# Patient Record
Sex: FEMALE | Race: BLACK OR AFRICAN AMERICAN | Employment: FULL TIME | URBAN - METROPOLITAN AREA
[De-identification: names, ages, dates, MRNs, and addresses within clinical notes are randomized per-mention and may not be internally consistent; named-entity substitution may affect disease eponyms.]

---

## 2020-01-28 ENCOUNTER — HOSPITAL ENCOUNTER (EMERGENCY)
Age: 50
Discharge: HOME | End: 2020-01-28
Attending: EMERGENCY MEDICINE
Payer: COMMERCIAL

## 2020-01-28 ENCOUNTER — APPOINTMENT (OUTPATIENT)
Dept: GENERAL RADIOLOGY | Age: 50
End: 2020-01-28
Payer: COMMERCIAL

## 2020-01-28 VITALS
RESPIRATION RATE: 14 BRPM | HEART RATE: 81 BPM | DIASTOLIC BLOOD PRESSURE: 92 MMHG | SYSTOLIC BLOOD PRESSURE: 147 MMHG | TEMPERATURE: 98.3 F | OXYGEN SATURATION: 99 %

## 2020-01-28 PROCEDURE — 73590 X-RAY EXAM OF LOWER LEG: CPT

## 2020-01-28 PROCEDURE — 99283 EMERGENCY DEPT VISIT LOW MDM: CPT

## 2020-01-28 ASSESSMENT — ENCOUNTER SYMPTOMS: COLOR CHANGE: 0

## 2020-01-28 ASSESSMENT — PAIN DESCRIPTION - PAIN TYPE: TYPE: ACUTE PAIN

## 2020-01-28 ASSESSMENT — PAIN DESCRIPTION - LOCATION: LOCATION: LEG

## 2020-01-28 ASSESSMENT — PAIN SCALES - GENERAL: PAINLEVEL_OUTOF10: 10

## 2020-01-28 ASSESSMENT — PAIN DESCRIPTION - ORIENTATION: ORIENTATION: LEFT;LOWER

## 2020-01-28 ASSESSMENT — PAIN DESCRIPTION - DESCRIPTORS: DESCRIPTORS: ACHING

## 2020-01-28 NOTE — ED PROVIDER NOTES
[facility] ED  Emergency Department Encounter  EmergencyMedicine Resident     Pt Name:Paddy Epstein  MRN: [id number]  [place] 1970  Date of evaluation: 1/28/20  PCP:  SAMMIE Lin CNP    CHIEF COMPLAINT       Chief Complaint   Patient presents with    Leg Pain     left lower leg       HISTORY OF PRESENT ILLNESS  (Location/Symptom, Timing/Onset, Context/Setting, Quality, Duration, Modifying Factors, Severity.)      Hernán Nolasco is a 51 y.o. female who presents with left shin pain which has been ongoing for the last 3 weeks. She states that generally this is worse in the morning and improves over the course of the day. States that she has been using ibuprofen without significant improvement in symptoms. She denies any known injury to the area. She states that she has a very active job and is often on her feet. PAST MEDICAL / SURGICAL / SOCIAL / FAMILY HISTORY      has a past medical history of Hypertension. has a past surgical history that includes Tubal ligation.     Social History     Socioeconomic History    Marital status:      Spouse name: Not on file    Number of children: Not on file    Years of education: Not on file    Highest education level: Not on file   Occupational History    Not on file   Social Needs    Financial resource strain: Not on file    Food insecurity:     Worry: Not on file     Inability: Not on file    Transportation needs:     Medical: Not on file     Non-medical: Not on file   Tobacco Use    Smoking status: Former Smoker   Substance and Sexual Activity    Alcohol use: No    Drug use: No    Sexual activity: Not on file   Lifestyle    Physical activity:     Days per week: Not on file     Minutes per session: Not on file    Stress: Not on file   Relationships    Social connections:     Talks on phone: Not on file     Gets together: Not on file     Attends Evangelical service: Not on file     Active member of club or organization: Not on file     Attends meetings of clubs or organizations: Not on file     Relationship status: Not on file    Intimate partner violence:     Fear of current or ex partner: Not on file     Emotionally abused: Not on file     Physically abused: Not on file     Forced sexual activity: Not on file   Other Topics Concern    Not on file   Social History Narrative    Not on file       History reviewed. No pertinent family history. Allergies:  Bactrim [sulfamethoxazole-trimethoprim]    Home Medications:  Prior to Admission medications    Not on File       REVIEW OF SYSTEMS    (2-9 systems for level 4, 10 or more for level 5)      Review of Systems   Cardiovascular: Negative for leg swelling. Musculoskeletal: Negative for joint swelling. Skin: Negative for color change, rash and wound. Neurological: Negative for weakness and numbness. PHYSICAL EXAM   (up to 7 for level 4, 8 or more for level 5)      INITIAL VITALS:   BP (!) 147/92   Pulse 81   Temp 98.3 °F (36.8 °C) (Oral)   Resp 14   LMP 01/02/2020 (Within Days)   SpO2 99%     Physical Exam  Constitutional:       Appearance: Normal appearance. She is normal weight. Cardiovascular:      Rate and Rhythm: Normal rate and regular rhythm. Pulmonary:      Effort: Pulmonary effort is normal.      Breath sounds: Normal breath sounds. Musculoskeletal:      Comments: Mild tenderness of the lateral aspect of the left lower leg, no bony tenderness of the fibula or tibia. Popliteal, DP, and PT pulses intact and symmetrical.  Bilateral lower extremity strength intact and symmetrical.  Sensation intact and symmetrical   Skin:     General: Skin is warm. Capillary Refill: Capillary refill takes less than 2 seconds. Neurological:      Mental Status: She is alert.          DIFFERENTIAL  DIAGNOSIS     PLAN (LABS / IMAGING / EKG):  Orders Placed This Encounter   Procedures    XR TIBIA FIBULA LEFT (2 VIEWS)       MEDICATIONS ORDERED:  No

## 2020-01-28 NOTE — ED PROVIDER NOTES
[facility]     Emergency Department     Faculty Attestation    I performed a history and physical examination of the patient and discussed management with the resident. I reviewed the residents note and agree with the documented findings and plan of care. Any areas of disagreement are noted on the chart. I was personally present for the key portions of any procedures. I have documented in the chart those procedures where I was not present during the key portions. I have reviewed the emergency nurses triage note. I agree with the chief complaint, past medical history, past surgical history, allergies, medications, social and family history as documented unless otherwise noted below. Documentation of the HPI, Physical Exam and Medical Decision Making performed by medical students or scribes is based on my personal performance of the HPI, PE and MDM. For Physician Assistant/ Nurse Practitioner cases/documentation I have personally evaluated this patient and have completed at least one if not all key elements of the E/M (history, physical exam, and MDM). Additional findings are as noted.     Vital signs:   Vitals:    01/28/20 1321   BP: (!) 147/92   Pulse: 81   Resp: 14   Temp: 98.3 °F (36.8 °C)   SpO2: 99%                Nae Summers M.D,  Attending Emergency  Physician            Yamile Madrigal MD  01/28/20 3455

## 2020-01-28 NOTE — LETTER
[facility] ED  [address] [address] [address] [address]  Phone: [phone]               January 28, 2020    Patient: Sara Mercado   YOB: 1970   Date of Visit: 1/28/2020       To Whom It May Concern:    Norma Brewer was seen and treated in our emergency department on 1/28/2020. She may return to 01-.       Sincerely,     [facility] ER department         Signature:__________________________________

## 2021-09-19 ENCOUNTER — APPOINTMENT (OUTPATIENT)
Dept: GENERAL RADIOLOGY | Age: 51
DRG: 871 | End: 2021-09-19
Payer: COMMERCIAL

## 2021-09-19 ENCOUNTER — HOSPITAL ENCOUNTER (INPATIENT)
Age: 51
LOS: 2 days | Discharge: HOME | DRG: 871 | End: 2021-09-21
Attending: EMERGENCY MEDICINE | Admitting: FAMILY MEDICINE
Payer: COMMERCIAL

## 2021-09-19 DIAGNOSIS — R73.9 HYPERGLYCEMIA: ICD-10-CM

## 2021-09-19 DIAGNOSIS — U07.1 COVID-19: Primary | ICD-10-CM

## 2021-09-19 PROBLEM — J12.82 PNEUMONIA DUE TO COVID-19 VIRUS: Status: ACTIVE | Noted: 2021-09-19

## 2021-09-19 LAB
ABSOLUTE EOS #: 0 K/UL (ref 0–0.4)
ABSOLUTE IMMATURE GRANULOCYTE: 0.04 K/UL (ref 0–0.3)
ABSOLUTE LYMPH #: 0.88 K/UL (ref 1–4.8)
ABSOLUTE MONO #: 0.17 K/UL (ref 0.1–0.8)
ALBUMIN SERPL-MCNC: 3.7 G/DL (ref 3.5–5.2)
ALBUMIN/GLOBULIN RATIO: 1.1 (ref 1–2.5)
ALP BLD-CCNC: 58 U/L (ref 35–104)
ALT SERPL-CCNC: 16 U/L (ref 5–33)
ANION GAP SERPL CALCULATED.3IONS-SCNC: 13 MMOL/L (ref 9–17)
AST SERPL-CCNC: 27 U/L
ATYPICAL LYMPHOCYTE ABSOLUTE COUNT: 0.04 K/UL
ATYPICAL LYMPHOCYTES: 1 %
BASOPHILS # BLD: 0 % (ref 0–2)
BASOPHILS ABSOLUTE: 0 K/UL (ref 0–0.2)
BILIRUB SERPL-MCNC: 0.24 MG/DL (ref 0.3–1.2)
BUN BLDV-MCNC: 7 MG/DL (ref 6–20)
BUN/CREAT BLD: ABNORMAL (ref 9–20)
C-REACTIVE PROTEIN: 47.1 MG/L (ref 0–5)
CALCIUM SERPL-MCNC: 8.2 MG/DL (ref 8.6–10.4)
CHLORIDE BLD-SCNC: 103 MMOL/L (ref 98–107)
CO2: 22 MMOL/L (ref 20–31)
CREAT SERPL-MCNC: 0.68 MG/DL (ref 0.5–0.9)
D-DIMER QUANTITATIVE: 0.62 MG/L FEU
DIFFERENTIAL TYPE: ABNORMAL
EOSINOPHILS RELATIVE PERCENT: 0 % (ref 1–4)
FERRITIN: 49 UG/L (ref 13–150)
FIBRINOGEN: 520 MG/DL (ref 140–420)
GFR AFRICAN AMERICAN: >60 ML/MIN
GFR NON-AFRICAN AMERICAN: >60 ML/MIN
GFR SERPL CREATININE-BSD FRML MDRD: ABNORMAL ML/MIN/{1.73_M2}
GFR SERPL CREATININE-BSD FRML MDRD: ABNORMAL ML/MIN/{1.73_M2}
GLUCOSE BLD-MCNC: 95 MG/DL (ref 70–99)
HCT VFR BLD CALC: 38.1 % (ref 36.3–47.1)
HEMOGLOBIN: 11.7 G/DL (ref 11.9–15.1)
IMMATURE GRANULOCYTES: 1 %
LACTATE DEHYDROGENASE: 308 U/L (ref 135–214)
LACTIC ACID, SEPSIS WHOLE BLOOD: 1.3 MMOL/L (ref 0.5–1.9)
LACTIC ACID, SEPSIS: NORMAL MMOL/L (ref 0.5–1.9)
LYMPHOCYTES # BLD: 21 % (ref 24–44)
MCH RBC QN AUTO: 26.1 PG (ref 25.2–33.5)
MCHC RBC AUTO-ENTMCNC: 30.7 G/DL (ref 28.4–34.8)
MCV RBC AUTO: 85 FL (ref 82.6–102.9)
MONOCYTES # BLD: 4 % (ref 1–7)
MORPHOLOGY: ABNORMAL
MORPHOLOGY: ABNORMAL
NRBC AUTOMATED: 0 PER 100 WBC
PDW BLD-RTO: 15.5 % (ref 11.8–14.4)
PLATELET # BLD: 250 K/UL (ref 138–453)
PLATELET ESTIMATE: ABNORMAL
PMV BLD AUTO: 10 FL (ref 8.1–13.5)
POTASSIUM SERPL-SCNC: 3.5 MMOL/L (ref 3.7–5.3)
PROCALCITONIN: 0.05 NG/ML
RBC # BLD: 4.48 M/UL (ref 3.95–5.11)
RBC # BLD: ABNORMAL 10*6/UL
SARS-COV-2, RAPID: DETECTED
SEG NEUTROPHILS: 73 % (ref 36–66)
SEGMENTED NEUTROPHILS ABSOLUTE COUNT: 3.07 K/UL (ref 1.8–7.7)
SODIUM BLD-SCNC: 138 MMOL/L (ref 135–144)
SPECIMEN DESCRIPTION: ABNORMAL
TOTAL PROTEIN: 7.1 G/DL (ref 6.4–8.3)
TROPONIN INTERP: NORMAL
TROPONIN T: NORMAL NG/ML
TROPONIN, HIGH SENSITIVITY: <6 NG/L (ref 0–14)
WBC # BLD: 4.2 K/UL (ref 3.5–11.3)
WBC # BLD: ABNORMAL 10*3/UL

## 2021-09-19 PROCEDURE — 85384 FIBRINOGEN ACTIVITY: CPT

## 2021-09-19 PROCEDURE — 84484 ASSAY OF TROPONIN QUANT: CPT

## 2021-09-19 PROCEDURE — 83605 ASSAY OF LACTIC ACID: CPT

## 2021-09-19 PROCEDURE — 86140 C-REACTIVE PROTEIN: CPT

## 2021-09-19 PROCEDURE — 84145 PROCALCITONIN (PCT): CPT

## 2021-09-19 PROCEDURE — 96374 THER/PROPH/DIAG INJ IV PUSH: CPT

## 2021-09-19 PROCEDURE — 85025 COMPLETE CBC W/AUTO DIFF WBC: CPT

## 2021-09-19 PROCEDURE — 93005 ELECTROCARDIOGRAM TRACING: CPT | Performed by: STUDENT IN AN ORGANIZED HEALTH CARE EDUCATION/TRAINING PROGRAM

## 2021-09-19 PROCEDURE — 6360000002 HC RX W HCPCS: Performed by: STUDENT IN AN ORGANIZED HEALTH CARE EDUCATION/TRAINING PROGRAM

## 2021-09-19 PROCEDURE — 87635 SARS-COV-2 COVID-19 AMP PRB: CPT

## 2021-09-19 PROCEDURE — 71045 X-RAY EXAM CHEST 1 VIEW: CPT

## 2021-09-19 PROCEDURE — 99284 EMERGENCY DEPT VISIT MOD MDM: CPT

## 2021-09-19 PROCEDURE — 82728 ASSAY OF FERRITIN: CPT

## 2021-09-19 PROCEDURE — 83615 LACTATE (LD) (LDH) ENZYME: CPT

## 2021-09-19 PROCEDURE — 80053 COMPREHEN METABOLIC PANEL: CPT

## 2021-09-19 PROCEDURE — 85379 FIBRIN DEGRADATION QUANT: CPT

## 2021-09-19 PROCEDURE — 1200000000 HC SEMI PRIVATE

## 2021-09-19 PROCEDURE — 2580000003 HC RX 258: Performed by: STUDENT IN AN ORGANIZED HEALTH CARE EDUCATION/TRAINING PROGRAM

## 2021-09-19 PROCEDURE — 6370000000 HC RX 637 (ALT 250 FOR IP): Performed by: STUDENT IN AN ORGANIZED HEALTH CARE EDUCATION/TRAINING PROGRAM

## 2021-09-19 PROCEDURE — 87040 BLOOD CULTURE FOR BACTERIA: CPT

## 2021-09-19 PROCEDURE — 96375 TX/PRO/DX INJ NEW DRUG ADDON: CPT

## 2021-09-19 RX ORDER — ACETAMINOPHEN 650 MG/1
650 SUPPOSITORY RECTAL EVERY 6 HOURS PRN
Status: DISCONTINUED | OUTPATIENT
Start: 2021-09-19 | End: 2021-09-21 | Stop reason: HOSPADM

## 2021-09-19 RX ORDER — VITAMIN B COMPLEX
2000 TABLET ORAL DAILY
Status: DISCONTINUED | OUTPATIENT
Start: 2021-09-20 | End: 2021-09-20

## 2021-09-19 RX ORDER — SODIUM CHLORIDE 0.9 % (FLUSH) 0.9 %
5-40 SYRINGE (ML) INJECTION EVERY 12 HOURS SCHEDULED
Status: DISCONTINUED | OUTPATIENT
Start: 2021-09-19 | End: 2021-09-21 | Stop reason: HOSPADM

## 2021-09-19 RX ORDER — SODIUM CHLORIDE 0.9 % (FLUSH) 0.9 %
10 SYRINGE (ML) INJECTION PRN
Status: DISCONTINUED | OUTPATIENT
Start: 2021-09-19 | End: 2021-09-21 | Stop reason: HOSPADM

## 2021-09-19 RX ORDER — DEXAMETHASONE SODIUM PHOSPHATE 10 MG/ML
10 INJECTION INTRAMUSCULAR; INTRAVENOUS ONCE
Status: COMPLETED | OUTPATIENT
Start: 2021-09-19 | End: 2021-09-19

## 2021-09-19 RX ORDER — 0.9 % SODIUM CHLORIDE 0.9 %
1000 INTRAVENOUS SOLUTION INTRAVENOUS ONCE
Status: COMPLETED | OUTPATIENT
Start: 2021-09-19 | End: 2021-09-19

## 2021-09-19 RX ORDER — ACETAMINOPHEN 325 MG/1
650 TABLET ORAL ONCE
Status: COMPLETED | OUTPATIENT
Start: 2021-09-19 | End: 2021-09-19

## 2021-09-19 RX ORDER — ONDANSETRON 4 MG/1
4 TABLET, ORALLY DISINTEGRATING ORAL EVERY 8 HOURS PRN
Status: DISCONTINUED | OUTPATIENT
Start: 2021-09-19 | End: 2021-09-21 | Stop reason: HOSPADM

## 2021-09-19 RX ORDER — ACETAMINOPHEN 325 MG/1
650 TABLET ORAL EVERY 6 HOURS PRN
Status: DISCONTINUED | OUTPATIENT
Start: 2021-09-19 | End: 2021-09-21 | Stop reason: HOSPADM

## 2021-09-19 RX ORDER — GUAIFENESIN DEXTROMETHORPHAN HYDROBROMIDE ORAL SOLUTION 10; 100 MG/5ML; MG/5ML
5 SOLUTION ORAL EVERY 4 HOURS PRN
Status: DISCONTINUED | OUTPATIENT
Start: 2021-09-19 | End: 2021-09-21 | Stop reason: HOSPADM

## 2021-09-19 RX ORDER — ONDANSETRON 2 MG/ML
4 INJECTION INTRAMUSCULAR; INTRAVENOUS EVERY 6 HOURS PRN
Status: DISCONTINUED | OUTPATIENT
Start: 2021-09-19 | End: 2021-09-21 | Stop reason: HOSPADM

## 2021-09-19 RX ORDER — SODIUM CHLORIDE 9 MG/ML
25 INJECTION, SOLUTION INTRAVENOUS PRN
Status: DISCONTINUED | OUTPATIENT
Start: 2021-09-19 | End: 2021-09-21 | Stop reason: HOSPADM

## 2021-09-19 RX ADMIN — Medication 500 MG: at 20:08

## 2021-09-19 RX ADMIN — ACETAMINOPHEN 650 MG: 325 TABLET ORAL at 20:04

## 2021-09-19 RX ADMIN — DEXAMETHASONE SODIUM PHOSPHATE 10 MG: 10 INJECTION INTRAMUSCULAR; INTRAVENOUS at 20:04

## 2021-09-19 RX ADMIN — CEFTRIAXONE SODIUM 1000 MG: 1 INJECTION, POWDER, FOR SOLUTION INTRAMUSCULAR; INTRAVENOUS at 20:08

## 2021-09-19 RX ADMIN — SODIUM CHLORIDE 1000 ML: 9 INJECTION, SOLUTION INTRAVENOUS at 21:15

## 2021-09-19 ASSESSMENT — ENCOUNTER SYMPTOMS
RHINORRHEA: 0
ABDOMINAL PAIN: 0
SORE THROAT: 0
CHEST TIGHTNESS: 0
CONSTIPATION: 0
COUGH: 0
TACHYPNEA: 1
VOMITING: 0
SHORTNESS OF BREATH: 1
NAUSEA: 0
DIARRHEA: 0
BLOOD IN STOOL: 0
WHEEZING: 0

## 2021-09-19 ASSESSMENT — PAIN SCALES - GENERAL: PAINLEVEL_OUTOF10: 0

## 2021-09-19 NOTE — ED NOTES
Pt arrived to ED via self. Pt states she was exposed to  who tested positive. Pt states she has lost of taste and smel, SOB, and cough. Pt states she was tested because of an ordered test after finding out  was positive for covid. Pt states breathing is most difficult when laying flat. Pt states when she assessed her pulse ox today at home it was 87% RA. Pt routinely checks pulse ox.        Maxine Rosenthal RN  09/19/21 5718

## 2021-09-19 NOTE — ED PROVIDER NOTES
[facility] [address] ED     Emergency Department     Faculty Attestation    I performed a history and physical examination of the patient and discussed management with the resident. I reviewed the residents note and agree with the documented findings and plan of care. Any areas of disagreement are noted on the chart. I was personally present for the key portions of any procedures. I have documented in the chart those procedures where I was not present during the key portions. I have reviewed the emergency nurses triage note. I agree with the chief complaint, past medical history, past surgical history, allergies, medications, social and family history as documented unless otherwise noted below. For Physician Assistant/ Nurse Practitioner cases/documentation I have personally evaluated this patient and have completed at least one if not all key elements of the E/M (history, physical exam, and MDM). Additional findings are as noted. Patient who tested positive for Covid on Tuesday of this week presents with increasing shortness of breath. She says that she was to the point where she was starting to feel short of breath even at rest.  She says she has had fever and body aches. She denies abdominal pain, vomiting or diarrhea. On exam, patient is resting comfortably in the bed. Lungs are clear to auscultation bilaterally and heart sounds are tachycardic but regular. Abdomen is soft and nontender. Patient's oxygen saturation was in the 80s on room air. She is now up into the high 90s on 2 L nasal cannula. Will get chest x-ray and labs and plan to admit patient.     EKG Interpretation    Interpreted by emergency department physician    Rhythm: sinus tachycardia  Rate: tachycardia  Axis: normal  Ectopy: none  Conduction: normal  ST Segments: normal  T Waves: non specific changes  Q Waves: none    Clinical Impression: non-specific EKG and sinus tachycardia    MD Mckenzie Colmenares MD  Attending Emergency  Physician              Vladimir Weiss MD  09/19/21 Haider Yoder

## 2021-09-19 NOTE — ED PROVIDER NOTES
[facility] [address]  Emergency Department Encounter  EmergencyMedicine Resident     Pt Name:Paddy Maier  MRN: [id number]  [place] 1970  Date of evaluation: 9/19/21  PCP:  Janiya Howard, APRN - CNP    This patient was evaluated in the Emergency Department for symptoms described in the history of present illness. The patient was evaluated in the context of the global COVID-19 pandemic, which necessitated consideration that the patient might be at risk for infection with the SARS-CoV-2 virus that causes COVID-19. Institutional protocols and algorithms that pertain to the evaluation of patients at risk for COVID-19 are in a state of rapid change based on information released by regulatory bodies including the CDC and federal and state organizations. These policies and algorithms were followed during the patient's care in the ED. CHIEF COMPLAINT       Chief Complaint   Patient presents with    Positive For Covid-19     test came back Tuesday    Shortness of Breath       HISTORY OF PRESENT ILLNESS  (Location/Symptom, Timing/Onset, Context/Setting, Quality, Duration, Modifying Factors, Severity.)      Odessa Singh is a 49 y.o. female who presents with shortness of breath. Patient presents to the emergency department with shortness of breath for 2 days, progressively worsening, has pulse oximeter at home, satting in the mid 80s at rest, associated with generalized fatigue, lightheadedness, congestion, fever, chills, dysuria. Patient denies headache, visual changes, chest pain, abdominal pain, nausea, vomiting, diarrhea, lower extremity swelling or pain. Patient denies any past medical history, patient denies HTN, HLD, DM, CAD, DVT/PE, COPD, asthma. Patient does not take any medications daily. Patient tested positive for Covid 5 days ago, but was asymptomatic at that time,  was symptomatic and positive for Covid.     PAST MEDICAL / SURGICAL / SOCIAL / FAMILY HISTORY      has a past medical history of Hypertension. has a past surgical history that includes Tubal ligation. Social History     Socioeconomic History    Marital status:      Spouse name: Not on file    Number of children: Not on file    Years of education: Not on file    Highest education level: Not on file   Occupational History    Not on file   Tobacco Use    Smoking status: Former Smoker   Substance and Sexual Activity    Alcohol use: No    Drug use: No    Sexual activity: Not on file   Other Topics Concern    Not on file   Social History Narrative    Not on file     Social Determinants of Health     Financial Resource Strain:     Difficulty of Paying Living Expenses:    Food Insecurity:     Worried About [address] in the Last Year:     [address] [place] in the Last Year:    Transportation Needs:     Lack of Transportation (Medical):  Lack of Transportation (Non-Medical):    Physical Activity:     Days of Exercise per Week:     Minutes of Exercise per Session:    Stress:     Feeling of Stress :    Social Connections:     Frequency of Communication with Friends and Family:     Frequency of Social Gatherings with Friends and Family:     Attends Bahai Services:     Active Member of Clubs or Organizations:     Attends Club or Organization Meetings:     Marital Status:    Intimate Partner Violence:     Fear of Current or Ex-Partner:     Emotionally Abused:     Physically Abused:     Sexually Abused:        History reviewed. No pertinent family history. Allergies:  Bactrim [sulfamethoxazole-trimethoprim]    Home Medications:  Prior to Admission medications    Not on File       REVIEW OF SYSTEMS    (2-9 systems for level 4, 10 or more for level 5)      Review of Systems   Constitutional: Positive for chills, fatigue and fever. Negative for diaphoresis. HENT: Positive for congestion. Negative for rhinorrhea and sore throat. Eyes: Negative for visual disturbance.    Respiratory: Positive for shortness of breath. Negative for cough, chest tightness and wheezing. Cardiovascular: Negative for chest pain, palpitations and leg swelling. Gastrointestinal: Negative for abdominal pain, blood in stool, constipation, diarrhea, nausea and vomiting. Genitourinary: Positive for dysuria. Negative for frequency, hematuria and urgency. Musculoskeletal: Negative for arthralgias, myalgias, neck pain and neck stiffness. Skin: Negative for pallor and rash. Neurological: Positive for weakness and light-headedness. Negative for dizziness, syncope and headaches. Psychiatric/Behavioral: Negative for confusion. PHYSICAL EXAM   (up to 7 for level 4, 8 or more for level 5)      INITIAL VITALS:   /84   Pulse 87   Temp 100.4 °F (38 °C) (Oral)   Resp 22   SpO2 97%     Physical Exam  Constitutional:       General: She is not in acute distress. Appearance: She is well-developed. She is not ill-appearing, toxic-appearing or diaphoretic. Comments: Patient is alert and oriented, openly communicative, does not appear to be short of breath, speaking in full sentences, appears fatigued, but not ill or toxic appearing. HENT:      Head: Normocephalic and atraumatic. Right Ear: External ear normal.      Left Ear: External ear normal.   Eyes:      General:         Right eye: No discharge. Left eye: No discharge. Extraocular Movements: Extraocular movements intact. Pupils: Pupils are equal, round, and reactive to light. Neck:      Vascular: No JVD. Trachea: No tracheal deviation. Cardiovascular:      Rate and Rhythm: Normal rate and regular rhythm. Pulses: Normal pulses. Heart sounds: Murmur heard. No friction rub. No gallop. Comments: Patient has grade 2/6 holosystolic murmur  Pulmonary:      Effort: Pulmonary effort is normal. No respiratory distress. Breath sounds: Normal breath sounds. No stridor. No wheezing, rhonchi or rales. Chest:      Chest wall: No tenderness. Abdominal:      General: There is no distension. Palpations: Abdomen is soft. There is no mass. Tenderness: There is no abdominal tenderness. There is no right CVA tenderness, left CVA tenderness or guarding. Musculoskeletal:         General: No tenderness. Normal range of motion. Cervical back: Normal range of motion and neck supple. No rigidity or tenderness. Right lower leg: No edema. Left lower leg: No edema. Skin:     General: Skin is warm. Capillary Refill: Capillary refill takes less than 2 seconds. Neurological:      General: No focal deficit present. Mental Status: She is alert and oriented to person, place, and time. Cranial Nerves: No cranial nerve deficit. Sensory: No sensory deficit. Motor: No weakness. Coordination: Coordination normal.   Psychiatric:         Mood and Affect: Mood normal.         Behavior: Behavior normal.         DIFFERENTIAL  DIAGNOSIS     PLAN (LABS / IMAGING / EKG):  Orders Placed This Encounter   Procedures    Culture, Blood 1    Culture, Blood 1    COVID-19, Rapid    Culture, Urine    Sputum gram stain    Respiratory Culture    Legionella antigen, urine    Strep Pneumoniae Antigen    Culture, Respiratory, Sputum    XR CHEST PORTABLE    CBC Auto Differential    Comprehensive Metabolic Panel    C-Reactive Protein    Lactate, Sepsis    Procalcitonin    Urinalysis with Microscopic    FERRITIN    LACTATE DEHYDROGENASE    Fibrinogen    CBC Auto Differential    Comprehensive Metabolic Panel w/ Reflex to MG    Protime-INR    APTT    Fibrinogen    Procalcitonin    C-Reactive Protein    Lactate Dehydrogenase    Ferritin    D-Dimer, Quantitative    Troponin    Lactic Acid, Plasma    Vitamin D 25 Hydroxy    D-Dimer, Quantitative    ADULT DIET;  Regular    Vital signs per unit routine    Notify physician    Up as tolerated    Daily weights    Intake and dextromethorphan-guaiFENesin (ROBITUSSIN-DM)  MG/5ML liquid 5 mL    dexamethasone (DECADRON) tablet 6 mg    Vitamin D (CHOLECALCIFEROL) tablet 2,000 Units       DDX: Covid pneumonia, bacterial pneumonia, UTI    DIAGNOSTIC RESULTS / EMERGENCY DEPARTMENT COURSE / MDM   LAB RESULTS:  Results for orders placed or performed during the hospital encounter of 09/19/21   Culture, Blood 1    Specimen: Blood   Result Value Ref Range    Specimen Description . BLOOD     Special Requests NOT REPORTED     Culture NO GROWTH 2 HOURS    Culture, Blood 1    Specimen: Blood   Result Value Ref Range    Specimen Description . BLOOD     Special Requests NOT REPORTED     Culture NO GROWTH 2 HOURS    COVID-19, Rapid    Specimen: Nasopharyngeal Swab   Result Value Ref Range    Specimen Description . NASOPHARYNGEAL SWAB     SARS-CoV-2, Rapid DETECTED (A) Not Detected   CBC Auto Differential   Result Value Ref Range    WBC 4.2 3.5 - 11.3 k/uL    RBC 4.48 3.95 - 5.11 m/uL    Hemoglobin 11.7 (L) 11.9 - 15.1 g/dL    Hematocrit 38.1 36.3 - 47.1 %    MCV 85.0 82.6 - 102.9 fL    MCH 26.1 25.2 - 33.5 pg    MCHC 30.7 28.4 - 34.8 g/dL    RDW 15.5 (H) 11.8 - 14.4 %    Platelets 042 677 - 979 k/uL    MPV 10.0 8.1 - 13.5 fL    NRBC Automated 0.0 0.0 per 100 WBC    Differential Type NOT REPORTED     WBC Morphology NOT REPORTED     RBC Morphology NOT REPORTED     Platelet Estimate NOT REPORTED     Immature Granulocytes 1 (H) 0 %    Seg Neutrophils 73 (H) 36 - 66 %    Lymphocytes 21 (L) 24 - 44 %    Atypical Lymphocytes 1 %    Monocytes 4 1 - 7 %    Eosinophils % 0 (L) 1 - 4 %    Basophils 0 0 - 2 %    Absolute Immature Granulocyte 0.04 0.00 - 0.30 k/uL    Segs Absolute 3.07 1.8 - 7.7 k/uL    Absolute Lymph # 0.88 (L) 1.0 - 4.8 k/uL    Atypical Lymphocytes Absolute 0.04 k/uL    Absolute Mono # 0.17 0.1 - 0.8 k/uL    Absolute Eos # 0.00 0.0 - 0.4 k/uL    Basophils Absolute 0.00 0.0 - 0.2 k/uL    Morphology ANISOCYTOSIS PRESENT     Morphology 1+ Covid swab and acute phase reactant labs for evaluation of extent of Covid. Will admit for oxygen requirement. RADIOLOGY:  XR CHEST PORTABLE    Result Date: 9/19/2021  EXAMINATION: ONE XRAY VIEW OF THE CHEST 9/19/2021 7:38 pm COMPARISON: None. HISTORY: ORDERING SYSTEM PROVIDED HISTORY: Covid, dyspnea TECHNOLOGIST PROVIDED HISTORY: Covid, dyspnea Reason for Exam: upr FINDINGS: There are mild multifocal infiltrates within the lungs, including within the right upper lobe, right lower lobe, left lower lobe. No effusion is identified. The heart size is within normal limits. Bilateral pulmonary infiltrates, consistent with pneumonia given the history. EKG  EKG Interpretation    Interpreted by me    Rhythm: normal sinus   Rate: normal  Axis: Left axis  Ectopy: none  Conduction: LVH  ST Segments: no acute change  T Waves: no acute change  Q Waves: none    Clinical Impression: no acute changes and normal EKG, similar to prior EKG    All EKG's are interpreted by the Emergency Department Physician who either signs or Co-signs this chart in the absence of a cardiologist.    EMERGENCY DEPARTMENT COURSE:  Patient came to emergency department, HPI and physical exam were conducted. All nursing notes were reviewed. EKG found no acute changes, troponins negative, no concern for ACS. Covid positive. Chest x-ray consistent with Covid pneumonia. Patient has elevated LDH, CRP, fibrinogen. Dimer 0.62, per years criteria, no indication for CT imaging at this time. Admitted patient to [facility] for further management of patient's Covid. PROCEDURES:      CONSULTS:  IP CONSULT TO HOSPITALIST  IP CONSULT TO INFECTIOUS DISEASES    CRITICAL CARE:      FINAL IMPRESSION      1. COVID-19          DISPOSITION / [address] Admitted 09/19/2021 09:10:00 PM      PATIENT REFERRED TO:  No follow-up provider specified. DISCHARGE MEDICATIONS:  There are no discharge medications for this patient.       Juan Diego Yost, MD  Emergency Medicine Resident    (Please note that portions of thisnote were completed with a voice recognition program.  Efforts were made to edit the dictations but occasionally words are mis-transcribed.)       Pam Villanueva MD  Resident  09/20/21 8823

## 2021-09-20 LAB
-: ABNORMAL
ABSOLUTE EOS #: 0 K/UL (ref 0–0.44)
ABSOLUTE IMMATURE GRANULOCYTE: 0.03 K/UL (ref 0–0.3)
ABSOLUTE LYMPH #: 0.62 K/UL (ref 1.1–3.7)
ABSOLUTE MONO #: 0.11 K/UL (ref 0.1–1.2)
ALBUMIN SERPL-MCNC: 3.4 G/DL (ref 3.5–5.2)
ALBUMIN/GLOBULIN RATIO: 1 (ref 1–2.5)
ALP BLD-CCNC: 61 U/L (ref 35–104)
ALT SERPL-CCNC: 15 U/L (ref 5–33)
AMORPHOUS: ABNORMAL
ANION GAP SERPL CALCULATED.3IONS-SCNC: 12 MMOL/L (ref 9–17)
AST SERPL-CCNC: 23 U/L
BACTERIA: ABNORMAL
BASOPHILS # BLD: 0 % (ref 0–2)
BASOPHILS ABSOLUTE: 0 K/UL (ref 0–0.2)
BILIRUB SERPL-MCNC: 0.26 MG/DL (ref 0.3–1.2)
BILIRUBIN URINE: NEGATIVE
BUN BLDV-MCNC: 8 MG/DL (ref 6–20)
BUN/CREAT BLD: ABNORMAL (ref 9–20)
C-REACTIVE PROTEIN: 40 MG/L (ref 0–5)
CALCIUM SERPL-MCNC: 8.2 MG/DL (ref 8.6–10.4)
CASTS UA: ABNORMAL /LPF (ref 0–8)
CHLORIDE BLD-SCNC: 105 MMOL/L (ref 98–107)
CO2: 20 MMOL/L (ref 20–31)
COLOR: YELLOW
CREAT SERPL-MCNC: 0.53 MG/DL (ref 0.5–0.9)
CRYSTALS, UA: ABNORMAL /HPF
D-DIMER QUANTITATIVE: 0.55 MG/L FEU
DIFFERENTIAL TYPE: ABNORMAL
EKG ATRIAL RATE: 97 BPM
EKG P AXIS: 44 DEGREES
EKG P-R INTERVAL: 148 MS
EKG Q-T INTERVAL: 310 MS
EKG QRS DURATION: 60 MS
EKG QTC CALCULATION (BAZETT): 393 MS
EKG R AXIS: -4 DEGREES
EKG T AXIS: 27 DEGREES
EKG VENTRICULAR RATE: 97 BPM
EOSINOPHILS RELATIVE PERCENT: 0 % (ref 1–4)
EPITHELIAL CELLS UA: ABNORMAL /HPF (ref 0–5)
ESTIMATED AVERAGE GLUCOSE: 131 MG/DL
FERRITIN: 49 UG/L (ref 13–150)
FIBRINOGEN: 555 MG/DL (ref 140–420)
GFR AFRICAN AMERICAN: >60 ML/MIN
GFR NON-AFRICAN AMERICAN: >60 ML/MIN
GFR SERPL CREATININE-BSD FRML MDRD: ABNORMAL ML/MIN/{1.73_M2}
GFR SERPL CREATININE-BSD FRML MDRD: ABNORMAL ML/MIN/{1.73_M2}
GLUCOSE BLD-MCNC: 199 MG/DL (ref 65–105)
GLUCOSE BLD-MCNC: 200 MG/DL (ref 65–105)
GLUCOSE BLD-MCNC: 231 MG/DL (ref 70–99)
GLUCOSE URINE: NEGATIVE
HBA1C MFR BLD: 6.2 % (ref 4–6)
HCT VFR BLD CALC: 37.3 % (ref 36.3–47.1)
HEMOGLOBIN: 11.5 G/DL (ref 11.9–15.1)
IMMATURE GRANULOCYTES: 1 %
INR BLD: 0.9
KETONES, URINE: ABNORMAL
LACTATE DEHYDROGENASE: 304 U/L (ref 135–214)
LACTIC ACID, WHOLE BLOOD: 1.9 MMOL/L (ref 0.7–2.1)
LACTIC ACID: NORMAL MMOL/L
LEGIONELLA PNEUMOPHILIA AG, URINE: NEGATIVE
LEUKOCYTE ESTERASE, URINE: NEGATIVE
LYMPHOCYTES # BLD: 22 % (ref 24–43)
MCH RBC QN AUTO: 26.5 PG (ref 25.2–33.5)
MCHC RBC AUTO-ENTMCNC: 30.8 G/DL (ref 28.4–34.8)
MCV RBC AUTO: 85.9 FL (ref 82.6–102.9)
MONOCYTES # BLD: 4 % (ref 3–12)
MORPHOLOGY: ABNORMAL
MUCUS: ABNORMAL
NITRITE, URINE: NEGATIVE
NRBC AUTOMATED: 0 PER 100 WBC
OTHER OBSERVATIONS UA: ABNORMAL
PARTIAL THROMBOPLASTIN TIME: 26.9 SEC (ref 20.5–30.5)
PDW BLD-RTO: 15.5 % (ref 11.8–14.4)
PH UA: 6.5 (ref 5–8)
PLATELET # BLD: 274 K/UL (ref 138–453)
PLATELET ESTIMATE: ABNORMAL
PMV BLD AUTO: 10.5 FL (ref 8.1–13.5)
POTASSIUM SERPL-SCNC: 3.9 MMOL/L (ref 3.7–5.3)
PROCALCITONIN: 0.04 NG/ML
PROTEIN UA: NEGATIVE
PROTHROMBIN TIME: 9.7 SEC (ref 9.1–12.3)
RBC # BLD: 4.34 M/UL (ref 3.95–5.11)
RBC # BLD: ABNORMAL 10*6/UL
RBC UA: ABNORMAL /HPF (ref 0–4)
RENAL EPITHELIAL, UA: ABNORMAL /HPF
SEG NEUTROPHILS: 73 % (ref 36–65)
SEGMENTED NEUTROPHILS ABSOLUTE COUNT: 2.04 K/UL (ref 1.5–8.1)
SODIUM BLD-SCNC: 137 MMOL/L (ref 135–144)
SPECIFIC GRAVITY UA: 1.02 (ref 1–1.03)
TOTAL PROTEIN: 6.9 G/DL (ref 6.4–8.3)
TRICHOMONAS: ABNORMAL
TROPONIN INTERP: NORMAL
TROPONIN T: NORMAL NG/ML
TROPONIN, HIGH SENSITIVITY: <6 NG/L (ref 0–14)
TURBIDITY: CLEAR
URINE HGB: NEGATIVE
UROBILINOGEN, URINE: NORMAL
VITAMIN D 25-HYDROXY: 11.8 NG/ML (ref 30–100)
WBC # BLD: 2.8 K/UL (ref 3.5–11.3)
WBC # BLD: ABNORMAL 10*3/UL
WBC UA: ABNORMAL /HPF (ref 0–5)
YEAST: ABNORMAL

## 2021-09-20 PROCEDURE — 6370000000 HC RX 637 (ALT 250 FOR IP): Performed by: FAMILY MEDICINE

## 2021-09-20 PROCEDURE — 82306 VITAMIN D 25 HYDROXY: CPT

## 2021-09-20 PROCEDURE — 84484 ASSAY OF TROPONIN QUANT: CPT

## 2021-09-20 PROCEDURE — 93010 ELECTROCARDIOGRAM REPORT: CPT | Performed by: INTERNAL MEDICINE

## 2021-09-20 PROCEDURE — 99223 1ST HOSP IP/OBS HIGH 75: CPT | Performed by: FAMILY MEDICINE

## 2021-09-20 PROCEDURE — 2580000003 HC RX 258: Performed by: NURSE PRACTITIONER

## 2021-09-20 PROCEDURE — 85379 FIBRIN DEGRADATION QUANT: CPT

## 2021-09-20 PROCEDURE — 85610 PROTHROMBIN TIME: CPT

## 2021-09-20 PROCEDURE — APPSS30 APP SPLIT SHARED TIME 16-30 MINUTES: Performed by: NURSE PRACTITIONER

## 2021-09-20 PROCEDURE — 80053 COMPREHEN METABOLIC PANEL: CPT

## 2021-09-20 PROCEDURE — 83605 ASSAY OF LACTIC ACID: CPT

## 2021-09-20 PROCEDURE — 82728 ASSAY OF FERRITIN: CPT

## 2021-09-20 PROCEDURE — 81001 URINALYSIS AUTO W/SCOPE: CPT

## 2021-09-20 PROCEDURE — 86140 C-REACTIVE PROTEIN: CPT

## 2021-09-20 PROCEDURE — 83615 LACTATE (LD) (LDH) ENZYME: CPT

## 2021-09-20 PROCEDURE — 87449 NOS EACH ORGANISM AG IA: CPT

## 2021-09-20 PROCEDURE — 84145 PROCALCITONIN (PCT): CPT

## 2021-09-20 PROCEDURE — 85730 THROMBOPLASTIN TIME PARTIAL: CPT

## 2021-09-20 PROCEDURE — 83036 HEMOGLOBIN GLYCOSYLATED A1C: CPT

## 2021-09-20 PROCEDURE — 85384 FIBRINOGEN ACTIVITY: CPT

## 2021-09-20 PROCEDURE — 99254 IP/OBS CNSLTJ NEW/EST MOD 60: CPT | Performed by: INTERNAL MEDICINE

## 2021-09-20 PROCEDURE — 36415 COLL VENOUS BLD VENIPUNCTURE: CPT

## 2021-09-20 PROCEDURE — 82947 ASSAY GLUCOSE BLOOD QUANT: CPT

## 2021-09-20 PROCEDURE — 85025 COMPLETE CBC W/AUTO DIFF WBC: CPT

## 2021-09-20 PROCEDURE — 6370000000 HC RX 637 (ALT 250 FOR IP): Performed by: NURSE PRACTITIONER

## 2021-09-20 PROCEDURE — 87086 URINE CULTURE/COLONY COUNT: CPT

## 2021-09-20 PROCEDURE — 1200000000 HC SEMI PRIVATE

## 2021-09-20 PROCEDURE — 6360000002 HC RX W HCPCS: Performed by: NURSE PRACTITIONER

## 2021-09-20 RX ORDER — DEXTROSE MONOHYDRATE 25 G/50ML
12.5 INJECTION, SOLUTION INTRAVENOUS PRN
Status: DISCONTINUED | OUTPATIENT
Start: 2021-09-20 | End: 2021-09-21 | Stop reason: HOSPADM

## 2021-09-20 RX ORDER — DEXTROSE MONOHYDRATE 50 MG/ML
100 INJECTION, SOLUTION INTRAVENOUS PRN
Status: DISCONTINUED | OUTPATIENT
Start: 2021-09-20 | End: 2021-09-21 | Stop reason: HOSPADM

## 2021-09-20 RX ORDER — POTASSIUM CHLORIDE 7.45 MG/ML
10 INJECTION INTRAVENOUS PRN
Status: DISCONTINUED | OUTPATIENT
Start: 2021-09-20 | End: 2021-09-21 | Stop reason: HOSPADM

## 2021-09-20 RX ORDER — ASCORBIC ACID 500 MG
500 TABLET ORAL 2 TIMES DAILY
Status: DISCONTINUED | OUTPATIENT
Start: 2021-09-20 | End: 2021-09-21 | Stop reason: HOSPADM

## 2021-09-20 RX ORDER — NICOTINE POLACRILEX 4 MG
15 LOZENGE BUCCAL PRN
Status: DISCONTINUED | OUTPATIENT
Start: 2021-09-20 | End: 2021-09-21 | Stop reason: HOSPADM

## 2021-09-20 RX ORDER — VITAMIN B COMPLEX
6000 TABLET ORAL DAILY
Status: DISCONTINUED | OUTPATIENT
Start: 2021-09-21 | End: 2021-09-21 | Stop reason: HOSPADM

## 2021-09-20 RX ORDER — POTASSIUM CHLORIDE 20 MEQ/1
40 TABLET, EXTENDED RELEASE ORAL PRN
Status: DISCONTINUED | OUTPATIENT
Start: 2021-09-20 | End: 2021-09-21 | Stop reason: HOSPADM

## 2021-09-20 RX ADMIN — SODIUM CHLORIDE, PRESERVATIVE FREE 10 ML: 5 INJECTION INTRAVENOUS at 20:53

## 2021-09-20 RX ADMIN — OXYCODONE HYDROCHLORIDE AND ACETAMINOPHEN 500 MG: 500 TABLET ORAL at 20:53

## 2021-09-20 RX ADMIN — ENOXAPARIN SODIUM 30 MG: 30 INJECTION SUBCUTANEOUS at 20:52

## 2021-09-20 RX ADMIN — ENOXAPARIN SODIUM 30 MG: 30 INJECTION SUBCUTANEOUS at 08:56

## 2021-09-20 RX ADMIN — SODIUM CHLORIDE, PRESERVATIVE FREE 10 ML: 5 INJECTION INTRAVENOUS at 08:56

## 2021-09-20 RX ADMIN — INSULIN LISPRO 2 UNITS: 100 INJECTION, SOLUTION INTRAVENOUS; SUBCUTANEOUS at 20:58

## 2021-09-20 RX ADMIN — Medication 2000 UNITS: at 08:55

## 2021-09-20 RX ADMIN — DEXAMETHASONE 6 MG: 4 TABLET ORAL at 08:55

## 2021-09-20 RX ADMIN — SODIUM CHLORIDE, PRESERVATIVE FREE 10 ML: 5 INJECTION INTRAVENOUS at 03:19

## 2021-09-20 RX ADMIN — INSULIN LISPRO 6 UNITS: 100 INJECTION, SOLUTION INTRAVENOUS; SUBCUTANEOUS at 17:53

## 2021-09-20 ASSESSMENT — ENCOUNTER SYMPTOMS
DIARRHEA: 0
SORE THROAT: 1
WHEEZING: 0
NAUSEA: 0
VOMITING: 0
COUGH: 1
STRIDOR: 0
ABDOMINAL PAIN: 0
SHORTNESS OF BREATH: 1
CONSTIPATION: 0
BLOOD IN STOOL: 0

## 2021-09-20 ASSESSMENT — PAIN SCALES - GENERAL: PAINLEVEL_OUTOF10: 0

## 2021-09-20 NOTE — ED NOTES
Report given to [facility] 9967 with opp for questions      Tc Escobar RN  09/19/21 0884

## 2021-09-20 NOTE — CARE COORDINATION
Case Management Initial Discharge Plan  Veronica Santamaria,             Talked with:patient on the phone to discuss discharge plans. Information verified: address, contacts, phone number, , insurance Yes  Insurance Provider: Nate Alfaro    Emergency Contact/Next of Kin name & number:  Kip Tai [phone]  Who are involved in patient's support system? family    PCP: Deb Arechiga MD  Date of last visit: year ago, has appt in October      Discharge Planning    Living Arrangements:  Spouse/Significant Other, Children, Family Members     Home has 2 stories  3 stairs to climb to get into front door, 9 stairs to climb to reach second floor  Location of bedroom/bathroom in home upstairs    Patient able to perform ADL's:Independent    Current Services (outpatient & in home) none  DME equipment: none  DME provider: n/a    Is patient receiving oral anticoagulation therapy? No        Potential Assistance Needed:  N/A    Patient agreeable to home care: No  [place] of choice provided:  n/a    Prior SNF/Rehab Placement and Facility: N/A  Agreeable to SNF/Rehab: No  [place] of choice provided: n/a     Evaluation: n/a    Expected Discharge date:  21    Patient expects to be discharged to:   home    If home: is the family and/or caregiver wiling & able to provide support at home? Yes   Who will be providing this support? family*    Follow Up Appointment: Best Day/ Time:      Transportation provider: drove self  Transportation arrangements needed for discharge: No\    Readmission Risk              Risk of Unplanned Readmission:  11             Does patient have a readmission risk score greater than 14?: Yes  If yes, follow-up appointment must be made within 7 days of discharge.      Goals of Care: breathe easier      Educated patient on transitional options, provided freedom of choice and are agreeable with plan      Discharge Plan: home with family    [facility] [address]          Electronically signed by Gomez Lennon RN on 9/20/21 at 11:34 AM EDT

## 2021-09-20 NOTE — PLAN OF CARE
Problem: Airway Clearance - Ineffective  Goal: Achieve or maintain patent airway  9/20/2021 1556 by Ramila Gar RN  Outcome: Ongoing  9/20/2021 0609 by Nahid Frost RN  Outcome: Ongoing

## 2021-09-20 NOTE — H&P
[facility]  Office: [address], DO, Tatiana Shah, DO, Juan J Valdes, DO, Tracy Castro, DO, Luh Joseph MD, Mellisa Nicole MD, Berna Mcmahan MD, Shanon Stanley MD, Lily Rodriguez MD, Ivette Post MD, Abraham Valdes MD, Val Nuñez, DO, Brett Cobos DO, Henok Cui MD,  Kenyetta Gaitan DO, Stacey Butler MD, Denman Najjar, MD, Jamey Long MD, Bob Alvarado MD, , Liz Littlejohn MD, Franciso Schaumann, MD, Ella Kathleen MD, Tiffany Carvalho, [facility], [facility], CNP, Mehnaz Ramsey, CNP, Shreya Bonds, CNS, Leandro Staton, CNP, Tyree Albright, CNP, Kaia Wilkes, CNP, Jesica Conner, CNP, Kirk Klein, CNP, Romeo Rosas PA-C, Isaías Amador, [facility], Bryon Salcido, CNP, Fabian Lance, CNP, Chayito Betancur, CNP, Tres Shaver, CNP, Jonny Hunter, CNP, Jimmy Moon, CNP, Emile Thomas, CNP, Avinash Ray, CNP         [address]    HISTORY AND PHYSICAL EXAMINATION            Date:   9/20/2021  Patient name:  Yamil Pierce  Date of admission:  9/19/2021  6:22 PM  MRN:   [id number]  Account:  [de-identified]  YOB: 1970  PCP:    Marj Michel MD  Room:   2006/2006-01  Code Status:    Full Code    Chief Complaint:     Chief Complaint   Patient presents with    Positive For Covid-19     test came back Tuesday    Shortness of Breath       History Obtained From:     patient, electronic medical record    History of Present Illness:     Yamil Pierce is a 49 y.o.  Non- / non  female who presents with Positive For Covid-19 (test came back Tuesday) and Shortness of Breath   and is admitted to the hospital for the management of 19 pneumonia    Patient with known past medical history of hypertension, exsmoking, obesity who denies any prior cardiac or lung history presented to ER with progressively worsening shortness of breath 24 hours prior to presentation, she was diagnosed with Covid last week vomiting. Genitourinary: Negative for dysuria and frequency. Musculoskeletal: Negative for myalgias. Skin: Negative for rash. Neurological: Positive for weakness. Negative for dizziness, seizures and headaches. Psychiatric/Behavioral: The patient is not nervous/anxious. All other systems reviewed and are negative. Physical Exam:   /77   Pulse 85   Temp 100 °F (37.8 °C) (Oral)   Resp 22   SpO2 92% Comment: 92  Temp (24hrs), Av.5 °F (38.1 °C), Min:100 °F (37.8 °C), Max:101.1 °F (38.4 °C)    No results for input(s): POCGLU in the last 72 hours. No intake or output data in the 24 hours ending 21 1458    Physical Exam  Vitals and nursing note reviewed. Constitutional:       General: She is not in acute distress. Appearance: She is well-developed. She is obese. She is not diaphoretic. Interventions: Nasal cannula in place. HENT:      Head: Normocephalic and atraumatic. Right Ear: Hearing normal.      Left Ear: Hearing normal.      Nose: Nose normal. No rhinorrhea. Eyes:      General: Lids are normal.      Extraocular Movements:      Right eye: Normal extraocular motion. Left eye: Normal extraocular motion. Conjunctiva/sclera: Conjunctivae normal.      Right eye: Right conjunctiva is not injected. Left eye: Left conjunctiva is not injected. Pupils: Pupils are equal, round, and reactive to light. Pupils are equal.      Right eye: Pupil is reactive. Left eye: Pupil is reactive. Neck:      Thyroid: No thyromegaly. Vascular: No carotid bruit. Trachea: Trachea and phonation normal. No tracheal deviation. Cardiovascular:      Rate and Rhythm: Normal rate and regular rhythm. Pulses: Normal pulses. Heart sounds: Normal heart sounds. No murmur heard. Pulmonary:      Effort: Pulmonary effort is normal. No respiratory distress. Breath sounds: No stridor. Decreased breath sounds present.    Abdominal:      General: Bowel sounds are normal. There is no distension. Palpations: Abdomen is soft. There is no mass. Tenderness: There is no abdominal tenderness. There is no guarding. Musculoskeletal:         General: No tenderness. Cervical back: Neck supple. Skin:     General: Skin is warm and dry. Findings: No erythema, lesion or rash. Neurological:      Mental Status: She is alert and oriented to person, place, and time. She is not disoriented. Cranial Nerves: No cranial nerve deficit. Psychiatric:         Speech: Speech normal.         Behavior: Behavior normal. Behavior is cooperative.          Investigations:      Laboratory Testing:  Recent Results (from the past 24 hour(s))   EKG 12 Lead    Collection Time: 09/19/21  6:34 PM   Result Value Ref Range    Ventricular Rate 97 BPM    Atrial Rate 97 BPM    P-R Interval 148 ms    QRS Duration 60 ms    Q-T Interval 310 ms    QTc Calculation (Bazett) 393 ms    P Axis 44 degrees    R Axis -4 degrees    T Axis 27 degrees   CBC Auto Differential    Collection Time: 09/19/21  7:56 PM   Result Value Ref Range    WBC 4.2 3.5 - 11.3 k/uL    RBC 4.48 3.95 - 5.11 m/uL    Hemoglobin 11.7 (L) 11.9 - 15.1 g/dL    Hematocrit 38.1 36.3 - 47.1 %    MCV 85.0 82.6 - 102.9 fL    MCH 26.1 25.2 - 33.5 pg    MCHC 30.7 28.4 - 34.8 g/dL    RDW 15.5 (H) 11.8 - 14.4 %    Platelets 040 640 - 397 k/uL    MPV 10.0 8.1 - 13.5 fL    NRBC Automated 0.0 0.0 per 100 WBC    Differential Type NOT REPORTED     WBC Morphology NOT REPORTED     RBC Morphology NOT REPORTED     Platelet Estimate NOT REPORTED     Immature Granulocytes 1 (H) 0 %    Seg Neutrophils 73 (H) 36 - 66 %    Lymphocytes 21 (L) 24 - 44 %    Atypical Lymphocytes 1 %    Monocytes 4 1 - 7 %    Eosinophils % 0 (L) 1 - 4 %    Basophils 0 0 - 2 %    Absolute Immature Granulocyte 0.04 0.00 - 0.30 k/uL    Segs Absolute 3.07 1.8 - 7.7 k/uL    Absolute Lymph # 0.88 (L) 1.0 - 4.8 k/uL    Atypical Lymphocytes Absolute 0.04 k/uL Absolute Mono # 0.17 0.1 - 0.8 k/uL    Absolute Eos # 0.00 0.0 - 0.4 k/uL    Basophils Absolute 0.00 0.0 - 0.2 k/uL    Morphology ANISOCYTOSIS PRESENT     Morphology 1+ ELLIPTOCYTES    Comprehensive Metabolic Panel    Collection Time: 09/19/21  7:56 PM   Result Value Ref Range    Glucose 95 70 - 99 mg/dL    BUN 7 6 - 20 mg/dL    CREATININE 0.68 0.50 - 0.90 mg/dL    Bun/Cre Ratio NOT REPORTED 9 - 20    Calcium 8.2 (L) 8.6 - 10.4 mg/dL    Sodium 138 135 - 144 mmol/L    Potassium 3.5 (L) 3.7 - 5.3 mmol/L    Chloride 103 98 - 107 mmol/L    CO2 22 20 - 31 mmol/L    Anion Gap 13 9 - 17 mmol/L    Alkaline Phosphatase 58 35 - 104 U/L    ALT 16 5 - 33 U/L    AST 27 <32 U/L    Total Bilirubin 0.24 (L) 0.3 - 1.2 mg/dL    Total Protein 7.1 6.4 - 8.3 g/dL    Albumin 3.7 3.5 - 5.2 g/dL    Albumin/Globulin Ratio 1.1 1.0 - 2.5    GFR Non-African American >60 >60 mL/min    GFR African American >60 >60 mL/min    GFR Comment          GFR Staging NOT REPORTED    C-Reactive Protein    Collection Time: 09/19/21  7:56 PM   Result Value Ref Range    CRP 47.1 (H) 0.0 - 5.0 mg/L   Lactate, Sepsis    Collection Time: 09/19/21  7:56 PM   Result Value Ref Range    Lactic Acid, Sepsis NOT REPORTED 0.5 - 1.9 mmol/L    Lactic Acid, Sepsis, Whole Blood 1.3 0.5 - 1.9 mmol/L   Procalcitonin    Collection Time: 09/19/21  7:56 PM   Result Value Ref Range    Procalcitonin 0.05 <0.09 ng/mL   Troponin    Collection Time: 09/19/21  7:56 PM   Result Value Ref Range    Troponin, High Sensitivity <6 0 - 14 ng/L    Troponin T NOT REPORTED <0.03 ng/mL    Troponin Interp NOT REPORTED    FERRITIN    Collection Time: 09/19/21  7:56 PM   Result Value Ref Range    Ferritin 49 13 - 150 ug/L   LACTATE DEHYDROGENASE    Collection Time: 09/19/21  7:56 PM   Result Value Ref Range     (H) 135 - 214 U/L   COVID-19, Rapid    Collection Time: 09/19/21  7:58 PM    Specimen: Nasopharyngeal Swab   Result Value Ref Range    Specimen Description . NASOPHARYNGEAL SWAB SARS-CoV-2, Rapid DETECTED (A) Not Detected   Culture, Blood 1    Collection Time: 09/19/21  8:00 PM    Specimen: Blood   Result Value Ref Range    Specimen Description . BLOOD     Special Requests NOT REPORTED     Culture NO GROWTH 13 HOURS    Fibrinogen    Collection Time: 09/19/21  9:09 PM   Result Value Ref Range    Fibrinogen 520 (H) 140 - 420 mg/dL   D-Dimer, Quantitative    Collection Time: 09/19/21  9:09 PM   Result Value Ref Range    D-Dimer, Quant 0.62 mg/L FEU   CBC Auto Differential    Collection Time: 09/20/21  8:55 AM   Result Value Ref Range    WBC 2.8 (L) 3.5 - 11.3 k/uL    RBC 4.34 3.95 - 5.11 m/uL    Hemoglobin 11.5 (L) 11.9 - 15.1 g/dL    Hematocrit 37.3 36.3 - 47.1 %    MCV 85.9 82.6 - 102.9 fL    MCH 26.5 25.2 - 33.5 pg    MCHC 30.8 28.4 - 34.8 g/dL    RDW 15.5 (H) 11.8 - 14.4 %    Platelets 000 521 - 939 k/uL    MPV 10.5 8.1 - 13.5 fL    NRBC Automated 0.0 0.0 per 100 WBC    Differential Type NOT REPORTED     WBC Morphology NOT REPORTED     RBC Morphology NOT REPORTED     Platelet Estimate NOT REPORTED     Immature Granulocytes 1 (H) 0 %    Seg Neutrophils 73 (H) 36 - 65 %    Lymphocytes 22 (L) 24 - 43 %    Monocytes 4 3 - 12 %    Eosinophils % 0 (L) 1 - 4 %    Basophils 0 0 - 2 %    Absolute Immature Granulocyte 0.03 0.00 - 0.30 k/uL    Segs Absolute 2.04 1.50 - 8.10 k/uL    Absolute Lymph # 0.62 (L) 1.10 - 3.70 k/uL    Absolute Mono # 0.11 0.10 - 1.20 k/uL    Absolute Eos # 0.00 0.00 - 0.44 k/uL    Basophils Absolute 0.00 0.00 - 0.20 k/uL    Morphology ANISOCYTOSIS PRESENT    Comprehensive Metabolic Panel w/ Reflex to MG    Collection Time: 09/20/21  8:55 AM   Result Value Ref Range    Glucose 231 (H) 70 - 99 mg/dL    BUN 8 6 - 20 mg/dL    CREATININE 0.53 0.50 - 0.90 mg/dL    Bun/Cre Ratio NOT REPORTED 9 - 20    Calcium 8.2 (L) 8.6 - 10.4 mg/dL    Sodium 137 135 - 144 mmol/L    Potassium 3.9 3.7 - 5.3 mmol/L    Chloride 105 98 - 107 mmol/L    CO2 20 20 - 31 mmol/L    Anion Gap 12 9 - 17 mmol/L Alkaline Phosphatase 61 35 - 104 U/L    ALT 15 5 - 33 U/L    AST 23 <32 U/L    Total Bilirubin 0.26 (L) 0.3 - 1.2 mg/dL    Total Protein 6.9 6.4 - 8.3 g/dL    Albumin 3.4 (L) 3.5 - 5.2 g/dL    Albumin/Globulin Ratio 1.0 1.0 - 2.5    GFR Non-African American >60 >60 mL/min    GFR African American >60 >60 mL/min    GFR Comment          GFR Staging NOT REPORTED    Protime-INR    Collection Time: 09/20/21  8:55 AM   Result Value Ref Range    Protime 9.7 9.1 - 12.3 sec    INR 0.9    APTT    Collection Time: 09/20/21  8:55 AM   Result Value Ref Range    PTT 26.9 20.5 - 30.5 sec   Fibrinogen    Collection Time: 09/20/21  8:55 AM   Result Value Ref Range    Fibrinogen 555 (H) 140 - 420 mg/dL   C-Reactive Protein    Collection Time: 09/20/21  8:55 AM   Result Value Ref Range    CRP 40.0 (H) 0.0 - 5.0 mg/L   Lactate Dehydrogenase    Collection Time: 09/20/21  8:55 AM   Result Value Ref Range     (H) 135 - 214 U/L   Ferritin    Collection Time: 09/20/21  8:55 AM   Result Value Ref Range    Ferritin 49 13 - 150 ug/L   D-Dimer, Quantitative    Collection Time: 09/20/21  8:55 AM   Result Value Ref Range    D-Dimer, Quant 0.55 mg/L FEU   Troponin    Collection Time: 09/20/21  8:55 AM   Result Value Ref Range    Troponin, High Sensitivity <6 0 - 14 ng/L    Troponin T NOT REPORTED <0.03 ng/mL    Troponin Interp NOT REPORTED    Lactic Acid, Plasma    Collection Time: 09/20/21  8:55 AM   Result Value Ref Range    Lactic Acid NOT REPORTED mmol/L    Lactic Acid, Whole Blood 1.9 0.7 - 2.1 mmol/L   Vitamin D 25 Hydroxy    Collection Time: 09/20/21  8:55 AM   Result Value Ref Range    Vit D, 25-Hydroxy 11.8 (L) 30.0 - 100.0 ng/mL   Procalcitonin    Collection Time: 09/20/21  8:55 AM   Result Value Ref Range    Procalcitonin 0.04 <0.09 ng/mL   Urinalysis with Microscopic    Collection Time: 09/20/21  9:13 AM   Result Value Ref Range    Color, UA YELLOW YELLOW    Turbidity UA CLEAR CLEAR    Glucose, Ur NEGATIVE NEGATIVE    Bilirubin Urine NEGATIVE NEGATIVE    Ketones, Urine SMALL (A) NEGATIVE    Specific Gravity, UA 1.017 1.005 - 1.030    Urine Hgb NEGATIVE NEGATIVE    pH, UA 6.5 5.0 - 8.0    Protein, UA NEGATIVE NEGATIVE    Urobilinogen, Urine Normal Normal    Nitrite, Urine NEGATIVE NEGATIVE    Leukocyte Esterase, Urine NEGATIVE NEGATIVE    -          WBC, UA 0 TO 2 0 - 5 /HPF    RBC, UA 0 TO 2 0 - 4 /HPF    Casts UA  0 - 8 /LPF     0 TO 2 HYALINE Reference range defined for non-centrifuged specimen. Crystals, UA NOT REPORTED None /HPF    Epithelial Cells UA 2 TO 5 0 - 5 /HPF    Renal Epithelial, UA NOT REPORTED 0 /HPF    Bacteria, UA NOT REPORTED None    Mucus, UA NOT REPORTED None    Trichomonas, UA NOT REPORTED None    Amorphous, UA NOT REPORTED None    Other Observations UA NOT REPORTED NOT REQ. Yeast, UA NOT REPORTED None   Legionella antigen, urine    Collection Time: 09/20/21  9:14 AM    Specimen: Urine, clean catch   Result Value Ref Range    Legionella Pneumophilia Ag, Urine NEGATIVE        Imaging/Diagnostics:  XR CHEST PORTABLE    Result Date: 9/19/2021  Bilateral pulmonary infiltrates, consistent with pneumonia given the history.        Assessment :      Hospital Problems         Last Modified POA    Pneumonia due to COVID-19 virus 9/19/2021 Yes    Hypertension 9/20/2021 Yes          Plan:     Patient status inpatient in the Progressive Unit/Step down    COVID 19 Pneumonia :   Unvaccinated  Steroids 9/19  Vit C/D  Incentive spirometry  Titrate inflammatory markers  ID involved     Acute hypoxemic respiratory failure: Likely secondary to Covid 19 pneumonia, continue supplemental oxygen     Elevated blood sugar without diagnosis of diabetes: Get A1c, insulin sliding scale given patient is on steroids    Obesity as a complicating factor    Ex smoker    DVT & GI Ppx     Consultations:   IP CONSULT TO HOSPITALIST  IP CONSULT TO INFECTIOUS DISEASES     Patient is admitted as inpatient status because of co-morbidities listed above, severity of signs and symptoms as outlined, requirement for current medical therapies and most importantly because of direct risk to patient if care not provided in a hospital setting. Expected length of stay > 48 hours.     Krystal Elliott MD  9/20/2021  2:58 PM    Copy sent to Dr. Alberto Franco MD

## 2021-09-20 NOTE — CONSULTS
[facility] - Initial Consult Note COVID 19 Patient  Today's Date and Time: 9/20/2021, 5:59 AM    Impression :   · COVID 19 Suspect  · COVID 19 Confirmed Infection  · Covid tests:  · 9/14/21 positive  · 9/19/21: Positive  · Hypoxic respiratory distress    Recommendations:   · Antibiotic Rx:  · Monitor off antibiotics  · Covid Rx:  · Clinical Research will approach patient to explore if she qualifies for any of the COVID 19 treatment protocols. · Decadron initiated 9/19/21  · Monitor CRP    Medical Decision Making/Summary/Discussion:9/20/2021     · Patient admitted with suspected COVID 19 infection  · Covid test confirmed positive. Infection Control Recommendations   · Universal Precautions  · Airborne isolation  · Droplet Isolation    Antimicrobial Stewardship Recommendations     · Discontinuation of therapy  Coordination of Outpatient Care:   · Estimated Length of IV antimicrobials:TBD  · Patient will need Midline Catheter Insertion: TBD  · Patient will need PICC line Insertion: No  · Patient will need: Home IV , [place],  SNF,  LTAC:TBD  · Patient will need outpatient wound care:No    Chief complaint/reason for consultation:   · Concern for COVID infection      History of Present Illness:   Rozina Martínez is a 49y.o.-year-old female who was initially admitted on 9/19/2021. Patient seen at the request of Dr. Flor Junior:    Patient who tested positive on 9/14/21, and was asymptomatic at that time, presented through ER with complaints of worsening shortness of breath over the past 2 days with associated fatigue, productive cough, fever, chills and dysuria. She deneid any pertinent medical history. Work-up in th ED revealed a fever of 100.4 with an SpO2 of 97% on room air. CRP was elevated at 47.1. CXR:  ·  9/19/2021: There are mild multifocal infiltrates within the lungs, including within the right upper lobe, right lower lobe, left lower lobe. No effusion is identified. The heart size is within normal limits. Impression: Bilateral pulmonary infiltrates, consistent with pneumonia given the history. The patient was started on Decadron , Azithromycin and Rocephin. Antibiotics will be D/C    Patient admitted because of concerns with COVID 19, hypoxia, pneumonia. CURRENT EVALUATION : 9/20/2021    Afebrile  VS stable    Patient exhibiting respiratory distress. With exertion  Respiratory secretions: yes    Patient receiving supplemental oxygen. Room air  02 sat: 90  RR:22      % FIO2:   PEEP:      QTc:           NEWS Score: 0-4 Low risk group; 5-6: Medium risk group; 7 or above: High risk group  Parameters 3 2 1 0 1 2 3   Age    < 65   ? 65   RR ? 8  9-11 12-20  21-24 ? 25   O2 Sats ? 91 92-93 94-95 ? 96      Suppl O2  Yes  No      SBP ? 90  101-110 111-219   ? 220   HR ? 40  41-50 51-90  111-130 ? 131   Consciousness    Alert   Drowsiness, lethargy, or confusion   Temperature ? 35.0 C (95.0 F)  35.1-36.0 C 95.1-96.9 F 36.1-38.0 C 97.0-100.4 F 38.1-39.0 C 100.5-102.3 F ? 39.1 C ? 102.4 F      NEWS Score:   9/20/21: 3 low risk    Overall Daily Picture:      Worsening    Presence of secondary bacterial Infection:  No   Additional antibiotics: No    Labs, X rays reviewed: 9/20/2021    BUN:7  Cr:0.68    WBC:4.2  Hb:11.7  Plat: 250    Absolute Neutrophils:3.07  Absolute Lymphocytes:0.88  Neutrophil/Lymphocyte Ratio: 3.5 high risk    CRP: 47.1  Ferritin:49  LDH: 308    Pro Calcitonin:      Cultures:  Urine:  · 9-20-21: pending  Blood:  · 9-19-21; No growth  ·   Sputum :  ·   Wound:       Legionella: negative     CXR:     9/19/21    CAT:      Discussed with patient, RN, CC, IM. I have personally reviewed the past medical history, past surgical history, medications, social history, and family history, and I have updated the database accordingly.   Past Medical History:     Past Medical History:   Diagnosis Date    Hypertension        Past Surgical  History:     Past Surgical History:   Procedure Laterality Date    TUBAL LIGATION         Medications:      sodium chloride flush  5-40 mL IntraVENous 2 times per day    enoxaparin  30 mg SubCUTAneous BID    cefTRIAXone (ROCEPHIN) IV  1,000 mg IntraVENous Q24H    And    azithromycin  500 mg IntraVENous Q24H    dexamethasone  6 mg Oral Daily    Vitamin D  2,000 Units Oral Daily       Social History:     Social History     Socioeconomic History    Marital status:      Spouse name: Not on file    Number of children: Not on file    Years of education: Not on file    Highest education level: Not on file   Occupational History    Not on file   Tobacco Use    Smoking status: Former Smoker   Substance and Sexual Activity    Alcohol use: No    Drug use: No    Sexual activity: Not on file   Other Topics Concern    Not on file   Social History Narrative    Not on file     Social Determinants of Health     Financial Resource Strain:     Difficulty of Paying Living Expenses:    Food Insecurity:     Worried About Running Out of Food in the Last Year:     Ran Out of Food in the Last Year:    Transportation Needs:     Lack of Transportation (Medical):  Lack of Transportation (Non-Medical):    Physical Activity:     Days of Exercise per Week:     Minutes of Exercise per Session:    Stress:     Feeling of Stress :    Social Connections:     Frequency of Communication with Friends and Family:     Frequency of Social Gatherings with Friends and Family:     Attends Orthodoxy Services:     Active Member of Clubs or Organizations:     Attends Club or Organization Meetings:     Marital Status:    Intimate Partner Violence:     Fear of Current or Ex-Partner:     Emotionally Abused:     Physically Abused:     Sexually Abused:        Family History:   History reviewed. No pertinent family history.      Allergies:   Bactrim [sulfamethoxazole-trimethoprim]     Review of Systems: reviewed/ordered the following labs:    CBC with Differential:   Recent Labs     09/19/21 1956   WBC 4.2   HGB 11.7*   HCT 38.1      LYMPHOPCT 21*   MONOPCT 4     BMP:   Recent Labs     09/19/21 1956      K 3.5*      CO2 22   BUN 7   CREATININE 0.68     Hepatic Function Panel:   Recent Labs     09/19/21 1956   PROT 7.1   LABALBU 3.7   BILITOT 0.24*   ALKPHOS 58   ALT 16   AST 27     No results for input(s): RPR in the last 72 hours. No results for input(s): HIV in the last 72 hours. No results for input(s): BC in the last 72 hours. Lab Results   Component Value Date    RBC 4.48 09/19/2021    WBC 4.2 09/19/2021     Lab Results   Component Value Date    CREATININE 0.68 09/19/2021    GLUCOSE 95 09/19/2021       Medical Decision Making-Imaging:     Narrative   EXAMINATION:   ONE XRAY VIEW OF THE CHEST       9/19/2021 7:38 pm       COMPARISON:   None.       HISTORY:   ORDERING SYSTEM PROVIDED HISTORY: Covid, dyspnea   TECHNOLOGIST PROVIDED HISTORY:   Covid, dyspnea   Reason for Exam: upr       FINDINGS:   There are mild multifocal infiltrates within the lungs, including within the   right upper lobe, right lower lobe, left lower lobe.  No effusion is   identified.  The heart size is within normal limits.           Impression   Bilateral pulmonary infiltrates, consistent with pneumonia given the history.                 Medical Decision [id number]:       Medical Decision Making-Other:     Note:  · Labs, medications, radiologic studies were reviewed with personal review of films  · Large amounts of data were reviewed  · Discussed with nursing Staff, Discharge planner  · Infection Control and Prevention measures reviewed  · All prior entries were reviewed  · Administer medications as ordered  · Prognosis: Guarded  · Discharge planning reviewed      Thank you for allowing us to participate in the care of this patient. Please call with questions.     Stephanie Hampton, APRN - CNP ATTESTATION:    I have discussed the case, including pertinent history and exam findings with the APRN. I have evaluated the  History, physical findings and pictures of the patient and the key elements of the encounter have been performed by me. I have reviewed the laboratory data, other diagnostic studies and discussed them with the APRN. I have updated the medical record where necessary. I agree with the assessment, plan and orders as documented by the APRN.     Alyse Malik MD.      Pager: [phone] - Office: [phone]

## 2021-09-21 VITALS
OXYGEN SATURATION: 94 % | HEART RATE: 70 BPM | DIASTOLIC BLOOD PRESSURE: 89 MMHG | RESPIRATION RATE: 22 BRPM | SYSTOLIC BLOOD PRESSURE: 136 MMHG | TEMPERATURE: 98 F

## 2021-09-21 PROBLEM — U07.1 SEPSIS DUE TO COVID-19 (HCC): Status: ACTIVE | Noted: 2021-09-21

## 2021-09-21 PROBLEM — R79.82 ELEVATED C-REACTIVE PROTEIN (CRP): Status: ACTIVE | Noted: 2021-09-21

## 2021-09-21 PROBLEM — R79.89 ELEVATED D-DIMER: Status: ACTIVE | Noted: 2021-09-21

## 2021-09-21 PROBLEM — R73.9 HYPERGLYCEMIA: Status: ACTIVE | Noted: 2021-09-21

## 2021-09-21 PROBLEM — J96.01 ACUTE RESPIRATORY FAILURE WITH HYPOXIA (HCC): Status: ACTIVE | Noted: 2021-09-21

## 2021-09-21 PROBLEM — E66.01 MORBID OBESITY (HCC): Status: ACTIVE | Noted: 2021-09-21

## 2021-09-21 PROBLEM — A41.89 SEPSIS DUE TO COVID-19 (HCC): Status: ACTIVE | Noted: 2021-09-21

## 2021-09-21 PROBLEM — R74.02 ELEVATED LDH: Status: ACTIVE | Noted: 2021-09-21

## 2021-09-21 LAB
C-REACTIVE PROTEIN: 13.7 MG/L (ref 0–5)
CULTURE: NORMAL
D-DIMER QUANTITATIVE: 0.47 MG/L FEU
GLUCOSE BLD-MCNC: 114 MG/DL (ref 65–105)
GLUCOSE BLD-MCNC: 122 MG/DL (ref 65–105)
Lab: NORMAL
SPECIMEN DESCRIPTION: NORMAL

## 2021-09-21 PROCEDURE — 2580000003 HC RX 258: Performed by: NURSE PRACTITIONER

## 2021-09-21 PROCEDURE — 82947 ASSAY GLUCOSE BLOOD QUANT: CPT

## 2021-09-21 PROCEDURE — 99239 HOSP IP/OBS DSCHRG MGMT >30: CPT | Performed by: NURSE PRACTITIONER

## 2021-09-21 PROCEDURE — 36415 COLL VENOUS BLD VENIPUNCTURE: CPT

## 2021-09-21 PROCEDURE — 99232 SBSQ HOSP IP/OBS MODERATE 35: CPT | Performed by: INTERNAL MEDICINE

## 2021-09-21 PROCEDURE — 6370000000 HC RX 637 (ALT 250 FOR IP): Performed by: NURSE PRACTITIONER

## 2021-09-21 PROCEDURE — 6370000000 HC RX 637 (ALT 250 FOR IP): Performed by: FAMILY MEDICINE

## 2021-09-21 PROCEDURE — 85379 FIBRIN DEGRADATION QUANT: CPT

## 2021-09-21 PROCEDURE — 86140 C-REACTIVE PROTEIN: CPT

## 2021-09-21 PROCEDURE — 6360000002 HC RX W HCPCS: Performed by: NURSE PRACTITIONER

## 2021-09-21 RX ORDER — CHOLECALCIFEROL (VITAMIN D3) 50 MCG
2000 TABLET ORAL DAILY
Qty: 60 TABLET | Refills: 0 | Status: SHIPPED | OUTPATIENT
Start: 2021-09-22 | End: 2021-11-21

## 2021-09-21 RX ORDER — ALBUTEROL SULFATE 90 UG/1
2 AEROSOL, METERED RESPIRATORY (INHALATION) EVERY 6 HOURS PRN
Status: DISCONTINUED | OUTPATIENT
Start: 2021-09-21 | End: 2021-09-21 | Stop reason: HOSPADM

## 2021-09-21 RX ORDER — GLUCOSAMINE HCL/CHONDROITIN SU 500-400 MG
CAPSULE ORAL
Qty: 200 STRIP | Refills: 0 | Status: SHIPPED | OUTPATIENT
Start: 2021-09-21

## 2021-09-21 RX ORDER — GUAIFENESIN DEXTROMETHORPHAN HYDROBROMIDE ORAL SOLUTION 10; 100 MG/5ML; MG/5ML
5 SOLUTION ORAL EVERY 4 HOURS PRN
Qty: 300 ML | Refills: 0 | Status: SHIPPED | OUTPATIENT
Start: 2021-09-21 | End: 2021-10-01

## 2021-09-21 RX ORDER — BLOOD-GLUCOSE METER
1 EACH MISCELLANEOUS DAILY
Qty: 1 KIT | Refills: 0 | Status: SHIPPED | OUTPATIENT
Start: 2021-09-21

## 2021-09-21 RX ORDER — DEXAMETHASONE 6 MG/1
6 TABLET ORAL DAILY
Qty: 8 TABLET | Refills: 0 | Status: SHIPPED | OUTPATIENT
Start: 2021-09-22 | End: 2021-09-30

## 2021-09-21 RX ORDER — ALBUTEROL SULFATE 90 UG/1
2 AEROSOL, METERED RESPIRATORY (INHALATION) EVERY 6 HOURS PRN
Qty: 18 G | Refills: 0 | Status: SHIPPED | OUTPATIENT
Start: 2021-09-21

## 2021-09-21 RX ORDER — LANCETS 30 GAUGE
1 EACH MISCELLANEOUS DAILY
Qty: 100 EACH | Refills: 0 | Status: SHIPPED | OUTPATIENT
Start: 2021-09-21

## 2021-09-21 RX ORDER — ASCORBIC ACID 500 MG
500 TABLET ORAL 2 TIMES DAILY
Qty: 30 TABLET | Refills: 0 | Status: SHIPPED | OUTPATIENT
Start: 2021-09-21

## 2021-09-21 RX ADMIN — Medication 6000 UNITS: at 08:31

## 2021-09-21 RX ADMIN — ENOXAPARIN SODIUM 30 MG: 30 INJECTION SUBCUTANEOUS at 08:32

## 2021-09-21 RX ADMIN — DEXAMETHASONE 6 MG: 4 TABLET ORAL at 08:32

## 2021-09-21 RX ADMIN — OXYCODONE HYDROCHLORIDE AND ACETAMINOPHEN 500 MG: 500 TABLET ORAL at 08:32

## 2021-09-21 RX ADMIN — SODIUM CHLORIDE, PRESERVATIVE FREE 10 ML: 5 INJECTION INTRAVENOUS at 08:34

## 2021-09-21 ASSESSMENT — PAIN SCALES - GENERAL
PAINLEVEL_OUTOF10: 0

## 2021-09-21 NOTE — PROGRESS NOTES
[facility] - Progress Note COVID 19 Patient  Today's Date and Time: 9/21/2021, 2:03 PM    Impression :   · COVID 19 Suspect  · COVID 19 Confirmed Infection  · Covid tests:  · 9/14/21 positive  · 9/19/21: Positive  · Hypoxic respiratory distress    Recommendations:   · Antibiotic Rx:  · Monitor off antibiotics  · Covid Rx:  · Clinical Research will approach patient to explore if she qualifies for any of the COVID 19 treatment protocols. · Decadron initiated 9/19/21  · Monitor CRP    Medical Decision Making/Summary/Discussion:9/21/2021     · Patient admitted with suspected COVID 19 infection  · Covid test confirmed positive. Infection Control Recommendations   · Universal Precautions  · Airborne isolation  · Droplet Isolation    Antimicrobial Stewardship Recommendations     · Discontinuation of therapy  Coordination of Outpatient Care:   · Estimated Length of IV antimicrobials:TBD  · Patient will need Midline Catheter Insertion: TBD  · Patient will need PICC line Insertion: No  · Patient will need: Home IV , [place],  SNF,  LTAC:TBD  · Patient will need outpatient wound care:No    Chief complaint/reason for consultation:   · Concern for COVID infection      History of Present Illness:   Tanika Campbell is a 49y.o.-year-old female who was initially admitted on 9/19/2021. Patient seen at the request of Dr. Yajaira Burris:    Patient who tested positive on 9/14/21, and was asymptomatic at that time, presented through ER with complaints of worsening shortness of breath over the past 2 days with associated fatigue, productive cough, fever, chills and dysuria. She deneid any pertinent medical history. Work-up in th ED revealed a fever of 100.4 with an SpO2 of 97% on room air. CRP was elevated at 47.1. CXR:  ·  9/19/2021: There are mild multifocal infiltrates within the lungs, including within the right upper lobe, right lower lobe, left lower lobe.   No effusion is identified. The heart size is within normal limits. Impression: Bilateral pulmonary infiltrates, consistent with pneumonia given the history. The patient was started on Decadron , Azithromycin and Rocephin. Antibiotics will be D/C    Patient admitted because of concerns with COVID 19, hypoxia, pneumonia. CURRENT EVALUATION : 9/21/2021    Afebrile  VS stable    The patient was stable on 3 L NC.    CRP is trending down. Patient exhibiting respiratory distress. With exertion  Respiratory secretions: yes    Patient receiving supplemental oxygen. Room air-->3 L NC  02 sat: 90-->94  RR:22-->22      % FIO2:   PEEP:      QTc:         NEWS Score: 0-4 Low risk group; 5-6: Medium risk group; 7 or above: High risk group  Parameters 3 2 1 0 1 2 3   Age    < 65   ? 65   RR ? 8  9-11 12-20  21-24 ? 25   O2 Sats ? 91 92-93 94-95 ? 96      Suppl O2  Yes  No      SBP ? 90  101-110 111-219   ? 220   HR ? 40  41-50 51-90  111-130 ? 131   Consciousness    Alert   Drowsiness, lethargy, or confusion   Temperature ? 35.0 C (95.0 F)  35.1-36.0 C 95.1-96.9 F 36.1-38.0 C 97.0-100.4 F 38.1-39.0 C 100.5-102.3 F ? 39.1 C ? 102.4 F      NEWS Score:   9/20/21: 3 low risk    Overall Daily Picture:      Worsening    Presence of secondary bacterial Infection:  No   Additional antibiotics: No    Labs, X rays reviewed: 9/21/2021    BUN:7-->8  Cr:0.68-->0.53    WBC:4.2-->2.8  Hb:11.7-->11.5  Plat: 250-->274    Absolute Neutrophils:3.07  Absolute Lymphocytes:0.88  Neutrophil/Lymphocyte Ratio: 3.5 high risk    CRP: 47.1-->40-->13.7  Ferritin:49  LDH: 308    Pro Calcitonin:      Cultures:  Urine:  · 9-20-21: pending  Blood:  · 9-19-21; No growth  ·   Sputum :  ·   Wound:       Legionella: negative     CXR:     9/19/21    CAT:      Discussed with patient, RN, CC, IM.     I have personally reviewed the past medical history, past surgical history, medications, social history, and family history, and I have updated the database accordingly. Past Medical History:     Past Medical History:   Diagnosis Date    Hypertension        Past Surgical  History:     Past Surgical History:   Procedure Laterality Date    TUBAL LIGATION         Medications:      insulin lispro  0-18 Units SubCUTAneous TID WC    insulin lispro  0-9 Units SubCUTAneous Nightly    vitamin C  500 mg Oral BID    Vitamin D  6,000 Units Oral Daily    famotidine (PEPCID) injection  20 mg IntraVENous BID    sodium chloride flush  5-40 mL IntraVENous 2 times per day    enoxaparin  30 mg SubCUTAneous BID    dexamethasone  6 mg Oral Daily       Social History:     Social History     Socioeconomic History    Marital status:      Spouse name: Not on file    Number of children: Not on file    Years of education: Not on file    Highest education level: Not on file   Occupational History    Not on file   Tobacco Use    Smoking status: Former Smoker   Substance and Sexual Activity    Alcohol use: No    Drug use: No    Sexual activity: Not on file   Other Topics Concern    Not on file   Social History Narrative    Not on file     Social Determinants of Health     Financial Resource Strain:     Difficulty of Paying Living Expenses:    Food Insecurity:     Worried About Running Out of Food in the Last Year:     Ran Out of Food in the Last Year:    Transportation Needs:     Lack of Transportation (Medical):      Lack of Transportation (Non-Medical):    Physical Activity:     Days of Exercise per Week:     Minutes of Exercise per Session:    Stress:     Feeling of Stress :    Social Connections:     Frequency of Communication with Friends and Family:     Frequency of Social Gatherings with Friends and Family:     Attends [employer] Services:     Active Member of Clubs or Organizations:     Attends Club or Organization Meetings:     Marital Status:    Intimate Partner Violence:     Fear of Current or Ex-Partner:     Emotionally Abused:     Physically Abused:     Sexually Abused:        Family History:     Family History   Problem Relation Age of Onset    Heart Failure Mother     Diabetes Mother         Allergies:   Bactrim [sulfamethoxazole-trimethoprim]     Review of Systems:       Constitutional: No fevers or chills. No systemic complaints  Head: No headaches  Eyes: No double vision or blurry vision. No conjunctival inflammation. ENT: No sore throat or runny nose. . No hearing loss, tinnitus or vertigo. Cardiovascular: No chest pain or palpitations. No Shortness of breath. No SHEPHERD  Lung: No Shortness of breath or cough. No sputum production  Abdomen: No nausea, vomiting, diarrhea, or abdominal pain. Jef Ra No cramps. Genitourinary: No increased urinary frequency, or dysuria. No hematuria. No suprapubic or CVA pain  Musculoskeletal: No muscle aches or pains. No joint effusions, swelling or deformities  Hematologic: No bleeding or bruising. Neurologic: No headache, weakness, numbness, or tingling. Integument: No rash, no ulcers. Psychiatric: No depression. Endocrine: No polyuria, no polydipsia, no polyphagia. Physical Examination :     Patient Vitals for the past 8 hrs:   BP Temp Temp src Pulse Resp SpO2   09/21/21 1235 136/89 98 °F (36.7 °C) Oral 70 22 94 %     General Appearance: Awake, alert, and in no apparent distress  Head:  Normocephalic, no trauma  Eyes: Pupils equal, round, reactive to light; sclera anicteric; conjunctivae pink. No embolic phenomena. ENT: Oropharynx clear, without erythema, exudate, or thrush. No tenderness of sinuses. Mouth/throat: mucosa pink and moist. No lesions. Dentition in good repair. Neck:Supple, without lymphadenopathy. Thyroid normal, No bruits. Pulmonary/Chest: Clear to auscultation, without wheezes, rales, or rhonchi. No dullness to percussion. Cardiovascular: Regular rate and rhythm without murmurs, rubs, or gallops. Abdomen: Soft, non tender.  Bowel sounds normal. No organomegaly  All four Extremities: No cyanosis, clubbing, edema, or effusions. Neurologic: No gross sensory or motor deficits. Skin: Warm and dry with good turgor. No signs of peripheral arterial or venous insufficiency. No ulcerations. No open wounds. Medical Decision Making -Laboratory:   I have independently reviewed/ordered the following labs:    CBC with Differential:   Recent Labs     09/19/21 1956 09/20/21  0855   WBC 4.2 2.8*   HGB 11.7* 11.5*   HCT 38.1 37.3    274   LYMPHOPCT 21* 22*   MONOPCT 4 4     BMP:   Recent Labs     09/19/21 1956 09/20/21  0855    137   K 3.5* 3.9    105   CO2 22 20   BUN 7 8   CREATININE 0.68 0.53     Hepatic Function Panel:   Recent Labs     09/19/21 1956 09/20/21  0855   PROT 7.1 6.9   LABALBU 3.7 3.4*   BILITOT 0.24* 0.26*   ALKPHOS 58 61   ALT 16 15   AST 27 23     No results for input(s): RPR in the last 72 hours. No results for input(s): HIV in the last 72 hours. No results for input(s): BC in the last 72 hours.   Lab Results   Component Value Date    MUCUS NOT REPORTED 09/20/2021    RBC 4.34 09/20/2021    TRICHOMONAS NOT REPORTED 09/20/2021    WBC 2.8 09/20/2021    YEAST NOT REPORTED 09/20/2021    TURBIDITY CLEAR 09/20/2021     Lab Results   Component Value Date    CREATININE 0.53 09/20/2021    GLUCOSE 231 09/20/2021       Medical Decision Making-Imaging:     Narrative   EXAMINATION:   ONE XRAY VIEW OF THE CHEST       9/19/2021 7:38 pm       COMPARISON:   None.       HISTORY:   ORDERING SYSTEM PROVIDED HISTORY: Covid, dyspnea   TECHNOLOGIST PROVIDED HISTORY:   Covid, dyspnea   Reason for Exam: upr       FINDINGS:   There are mild multifocal infiltrates within the lungs, including within the   right upper lobe, right lower lobe, left lower lobe.  No effusion is   identified.  The heart size is within normal limits.           Impression   Bilateral pulmonary infiltrates, consistent with pneumonia given the history.                 Medical Decision [id number]-[id number]: Medical Decision Making-Other:     Note:  · Labs, medications, radiologic studies were reviewed with personal review of films  · Large amounts of data were reviewed  · Discussed with nursing Staff, Discharge planner  · Infection Control and Prevention measures reviewed  · All prior entries were reviewed  · Administer medications as ordered  · Prognosis: Guarded  · Discharge planning reviewed      Thank you for allowing us to participate in the care of this patient. Please call with questions. Conner Jaquez APRN - CNP     ATTESTATION:    I have discussed the case, including pertinent history and exam findings with the APRN. I have evaluated the  History, physical findings and pictures of the patient and the key elements of the encounter have been performed by me. I have reviewed the laboratory data, other diagnostic studies and discussed them with the APRN. I have updated the medical record where necessary. I agree with the assessment, plan and orders as documented by the APRN.     Guilherme Landaverde MD.          Pager: [phone] - Office: [phone]

## 2021-09-21 NOTE — PROGRESS NOTES
Physician Progress Note      Jorge L Dominguez  CSN #:                  [id number]  :                       1970  ADMIT DATE:       2021 6:22 PM  [address] [place] DATE:  RESPONDING  PROVIDER #:        Suresh Alonso NP          QUERY TEXT:    Pt admitted with COVID-19 Pneumonia and noted to have some SIRS criteria. If   possible, please document in progress notes and discharge summary if you are   evaluating and/or treating: The medical record reflects the following:  Risk Factors: Obesity,unvaccinated  Clinical Indicators: Pt with Covid Pneumonia, Acute Hypoxic Respiratory   failure, Febrile -TMax 101.1,Tachypnec,hypertensive. CRP 47, Fibrinogen   555,elevated LDH  Treatment: ID consult, Supplemental O2, Decadron  Options provided:  -- Sepsis present on admission due to COVID-19 pneumonia  -- Covid-19 pneumonia without sepsis  -- Other - I will add my own diagnosis  -- Disagree - Not applicable / Not valid  -- Disagree - Clinically unable to determine / Unknown  -- Refer to Clinical Documentation Reviewer    PROVIDER RESPONSE TEXT:    This patient has sepsis which was present on admission due to COVID-19   pneumonia.     Query created by: Yani Sebastian on 2021 8:27 AM      Electronically signed by:  Khanh Alonso NP 2021 8:52 AM

## 2021-09-21 NOTE — PLAN OF CARE
Problem: Airway Clearance - Ineffective  Goal: Achieve or maintain patent airway  9/21/2021 1155 by Kentrell Lynn RN  Outcome: Ongoing     Problem: Gas Exchange - Impaired  Goal: Absence of hypoxia  9/21/2021 1155 by Kentrell Lynn RN  Outcome: Ongoing     Problem: Gas Exchange - Impaired  Goal: Promote optimal lung function  9/21/2021 1155 by Kentrell Lynn RN  Outcome: Ongoing     Problem: Breathing Pattern - Ineffective  Goal: Ability to achieve and maintain a regular respiratory rate  9/21/2021 1155 by Kentrell Lynn RN  Outcome: Ongoing     Problem:  Body Temperature -  Risk of, Imbalanced  Goal: Ability to maintain a body temperature within defined limits  9/21/2021 1155 by Kentrell Lynn RN  Outcome: Ongoing     Problem: Fatigue  Goal: Verbalize increase energy and improved vitality  9/21/2021 1155 by Kentrell Lynn RN  Outcome: Ongoing

## 2021-09-21 NOTE — DISCHARGE SUMMARY
[facility]  Office: [address], DO, Jaime Inocente, DO, [place]matt David, DO, Jose F Bashir Blood, DO, Stephanie Parada MD, Allyssa Rosas MD, Paddy Ureña MD, Evette Sanchez MD, Kristine Marquez MD, Cary Rosenberg MD, Lyssa Oseguera MD, Florencio Isabel, DO, Gordy Jauregui, DO, Jadene Boast, MD,  Lulu Cao DO, Arron Jimenez MD, Isatu Wong MD, Emily Jacobson MD, Latoya Gusman MD, , Brett Hayward MD, Nyoka Hodgkins, MD, Angy Zepeda MD, Michelle Benítez [facility], [facility], CNP, Perlita Arechiga, CNP, Zain Hernández, CNS, Jemma Rear, CNP, Rosalina Khanna, CNP, Noel Foot, CNP, Laurie Gamma, CNP, Sarah Loser, CNP, Blanca Granado PA-C, Boo Baltazar DNP, Silvano Krishnamurthy, CNP, Cailin Ordoñez, CNP, Radha Russo, CNP, Lorenzo Mayorga, CNP, Elana Elkins, CNP, Baldev Treviño, CNP, Sarah Russo, CNP, Paramjit Smith, [address] [place] [address]    Discharge Summary     Patient ID: Cat Gonsales  :  1970   MRN: [id number]     ACCOUNT:  [de-identified]   Patient's PCP: Shayan Gomez MD  Admit Date: 2021   Discharge Date: 2021     Length of Stay: 2  Code Status:  Full Code  Admitting Physician: Evette Sanchez MD  Discharge Physician: Boo Baltazar, APRN - NP     Active Discharge Diagnoses:     Hospital Problem Lists:  Active Problems:    Pneumonia due to COVID-19 virus    Acute respiratory failure with hypoxia (HCC)    Elevated C-reactive protein (CRP)    Elevated LDH    Elevated d-dimer    Hyperglycemia    Morbid obesity ([facility] [address]    Sepsis due to COVID-19 [facility])  Resolved Problems:    * No resolved hospital problems.  *      Admission Condition:  fair     Discharged Condition: good    Hospital Stay:     Hospital Course:  Cat Gonsales is a 49 y.o. female who was admitted for the management of   <principal problem not specified> , presented to ER with Positive For Covid-19 (test came back Tuesday) and Shortness of 11.5 09/20/2021    HCT 37.3 09/20/2021    MCV 85.9 09/20/2021    MCH 26.5 09/20/2021    MCHC 30.8 09/20/2021    RDW 15.5 09/20/2021     09/20/2021     BMP:    Lab Results   Component Value Date    GLUCOSE 231 09/20/2021     09/20/2021    K 3.9 09/20/2021     09/20/2021    CO2 20 09/20/2021    ANIONGAP 12 09/20/2021    BUN 8 09/20/2021    CREATININE 0.53 09/20/2021    BUNCRER NOT REPORTED 09/20/2021    CALCIUM 8.2 09/20/2021    LABGLOM >60 09/20/2021    GFRAA >60 09/20/2021    GFR      09/20/2021    GFR NOT REPORTED 09/20/2021     HFP:    Lab Results   Component Value Date    PROT 6.9 09/20/2021     CMP:    Lab Results   Component Value Date    GLUCOSE 231 09/20/2021     09/20/2021    K 3.9 09/20/2021     09/20/2021    CO2 20 09/20/2021    BUN 8 09/20/2021    CREATININE 0.53 09/20/2021    ANIONGAP 12 09/20/2021    ALKPHOS 61 09/20/2021    ALT 15 09/20/2021    AST 23 09/20/2021    BILITOT 0.26 09/20/2021    LABALBU 3.4 09/20/2021    ALBUMIN 1.0 09/20/2021    LABGLOM >60 09/20/2021    GFRAA >60 09/20/2021    GFR      09/20/2021    GFR NOT REPORTED 09/20/2021    PROT 6.9 09/20/2021    CALCIUM 8.2 09/20/2021     PT/INR:    Lab Results   Component Value Date    PROTIME 9.7 09/20/2021    INR 0.9 09/20/2021     PTT:   Lab Results   Component Value Date    APTT 26.9 09/20/2021     FLP:    Lab Results   Component Value Date    CHOL 145 09/11/2014    TRIG 140 09/11/2014    HDL 36 09/11/2014     U/A:    Lab Results   Component Value Date    COLORU YELLOW 09/20/2021    TURBIDITY CLEAR 09/20/2021    SPECGRAV 1.017 09/20/2021    HGBUR NEGATIVE 09/20/2021    PHUR 6.5 09/20/2021    PROTEINU NEGATIVE 09/20/2021    GLUCOSEU NEGATIVE 09/20/2021    KETUA SMALL 09/20/2021    BILIRUBINUR NEGATIVE 09/20/2021    UROBILINOGEN Normal 09/20/2021    NITRU NEGATIVE 09/20/2021    LEUKOCYTESUR NEGATIVE 09/20/2021     TSH:    Lab Results   Component Value Date    TSH 0.62 09/11/2014        Radiology:  XR CHEST tablet  · dexamethasone 6 MG tablet  · dextromethorphan-guaiFENesin  MG/5ML syrup  · ONE TOUCH ULTRA 2 w/Device Kit  · vitamin D 50 MCG (2000 UT) Tabs tablet         No discharge procedures on file. Time Spent on discharge is  31 mins in patient examination, evaluation, counseling as well as medication reconciliation, prescriptions for required medications, discharge plan and follow up. Discussed with attending   Electronically signed by   SAMMIE Purdy NP  9/21/2021  2:51 PM      Thank you Dr. Carlotta Antony MD for the opportunity to be involved in this patient's care.

## 2021-09-21 NOTE — PROGRESS NOTES
exsmoking, obesity who denies any prior cardiac or lung history presented to ER with progressively worsening shortness of breath 24 hours prior to presentation, she was diagnosed with Covid last week after her  had fever and she checked herself per job request and she came back positive she did develop some fever and chills afterwards around was loss of taste and smell but denies any other GI or URI symptoms, she did noted that her breathing is worsening just 24 hours prior to presentation to the point that she has dyspnea at rest which prompted her to come to ER. In the ER she was febrile with T-max of 101.1, confirmed positive and noted to have elevated CRP at 47, elevated LDH, D-dimer of 0.62. Chest x-ray confirmed bilateral infiltrate compatible with Covid pneumonia. Patient needed to be placed on nasal cannula in the ER she is currently on 3 L saturating about 90 to 92%. Patient was admitted to our service over the management     Review of Systems:     Constitutional:  negative for chills, fevers, sweats  Respiratory:  + cough, +dyspnea on exertion, shortness of breath, wheezing  Cardiovascular:  negative for chest pain, chest pressure/discomfort, lower extremity edema, palpitations  Gastrointestinal:  negative for abdominal pain, constipation, diarrhea, nausea, vomiting  Neurological:  negative for dizziness, headache    Medications: Allergies:     Allergies   Allergen Reactions    Bactrim [Sulfamethoxazole-Trimethoprim]        Current Meds:   Scheduled Meds:    insulin lispro  0-18 Units SubCUTAneous TID WC    insulin lispro  0-9 Units SubCUTAneous Nightly    vitamin C  500 mg Oral BID    Vitamin D  6,000 Units Oral Daily    famotidine (PEPCID) injection  20 mg IntraVENous BID    sodium chloride flush  5-40 mL IntraVENous 2 times per day    enoxaparin  30 mg SubCUTAneous BID    dexamethasone  6 mg Oral Daily     Continuous Infusions:    dextrose      dextrose      sodium chloride PRN Meds: albuterol sulfate HFA, potassium chloride **OR** potassium alternative oral replacement **OR** potassium chloride, glucose, dextrose, glucagon (rDNA), dextrose, glucose, dextrose, glucagon (rDNA), dextrose, sodium chloride flush, sodium chloride, ondansetron **OR** ondansetron, magnesium hydroxide, acetaminophen **OR** acetaminophen, dextromethorphan-guaiFENesin    Data:     Past Medical History:   has a past medical history of Hypertension. Social History:   reports that she has quit smoking. She does not have any smokeless tobacco history on file. She reports that she does not drink alcohol and does not use drugs. Family History:   Family History   Problem Relation Age of Onset    Heart Failure Mother     Diabetes Mother        Vitals:  BP (!) 127/91   Pulse 65   Temp 97.9 °F (36.6 °C) (Oral)   Resp 20   SpO2 90%   Temp (24hrs), Av °F (36.7 °C), Min:97.9 °F (36.6 °C), Max:98 °F (36.7 °C)    Recent Labs     21  1643 21  2042 21  0828   POCGLU 200* 199* 122*       I/O (24Hr):     Intake/Output Summary (Last 24 hours) at 2021 0853  Last data filed at 2021 8136  Gross per 24 hour   Intake 480 ml   Output    Net 480 ml       Labs:  Hematology:  Recent Labs     21  19521  2109 21  0855 21  0509   WBC 4.2  --  2.8*  --    RBC 4.48  --  4.34  --    HGB 11.7*  --  11.5*  --    HCT 38.1  --  37.3  --    MCV 85.0  --  85.9  --    MCH 26.1  --  26.5  --    MCHC 30.7  --  30.8  --    RDW 15.5*  --  15.5*  --      --  274  --    MPV 10.0  --  10.5  --    CRP 47.1*  --  40.0* 13.7*   INR  --   --  0.9  --    DDIMER  --  0.62 0.55 0.47     Chemistry:  Recent Labs     21  0855    137   K 3.5* 3.9    105   CO2 22 20   GLUCOSE 95 231*   BUN 7 8   CREATININE 0.68 0.53   ANIONGAP 13 12   LABGLOM >60 >60   GFRAA >60 >60   CALCIUM 8.2* 8.2*   TROPHS <6 <6   LACTACIDWB  --  1.9     Recent Labs     21 09/20/21  0855 09/20/21  1643 09/20/21  2042 09/21/21  0828   PROT 7.1 6.9  --   --   --    LABALBU 3.7 3.4*  --   --   --    LABA1C  --  6.2*  --   --   --    AST 27 23  --   --   --    ALT 16 15  --   --   --    * 304*  --   --   --    ALKPHOS 58 61  --   --   --    BILITOT 0.24* 0.26*  --   --   --    POCGLU  --   --  200* 199* 122*       Lab Results   Component Value Date/Time    SPECIAL NOT REPORTED 09/20/2021 09:13 AM     Lab Results   Component Value Date/Time    CULTURE NO SIGNIFICANT GROWTH 09/20/2021 09:13 AM       Radiology:  XR CHEST PORTABLE    Result Date: 9/19/2021  Bilateral pulmonary infiltrates, consistent with pneumonia given the history. Physical Examination:        General appearance:  alert, cooperative and no distress  Mental Status:  oriented to person, place and time and normal affect  Lungs:  clear to auscultation anteriorly, diminished posteriorly, normal effort, 3 L low flow nasal cannula  Heart:  regular rate and rhythm, no murmur  Abdomen: Obese, soft, nontender, nondistended, normal bowel sounds, no masses, hepatomegaly, splenomegaly  Extremities:  no edema, redness, tenderness in the calves  Skin:  no gross lesions, rashes, induration    Assessment:        Hospital Problems         Last Modified POA    Pneumonia due to COVID-19 virus 9/19/2021 Yes    Acute respiratory failure with hypoxia (HCC) 9/21/2021 Yes    Elevated C-reactive protein (CRP) 9/21/2021 Yes    Elevated LDH 9/21/2021 Yes    Elevated d-dimer 9/21/2021 Yes    Hyperglycemia 9/21/2021 Yes    Morbid obesity ([address] 9/21/2021 Yes    Sepsis due to COVID-19 ([facility] [address] 9/21/2021 Yes          Plan:        1. COVID-19 viral pneumonia:   - Infectious disease following.    - Continue isolation precautions. - Decadron day 2/10.    - Continue vitamin C, vitamin D, Mucinex, start albuterol as needed    2. Acute hypoxic respiratory failure:   - Continue low-flow O2, wean as applicable  - Home O2 evaluation today    3.  Sepsis: Secondary to #1    4. Elevated CRP: Secondary to #1    5. Elevated LDH: Secondary to #1    6. Hyperglycemia:   - A1c pending.    - Continue HIISS with coadministration of steroid. - Follow-up with primary care provider in 3 months for additional A1c    7. Elevated D-dimer: Secondary to #1    8. Morbid obesity: Weight loss encouraged    9.  GI/DVT prophylaxis: Pepcid, Lovenox twice daily    SAMMIE hCu NP  9/21/2021  8:53 AM

## 2021-09-21 NOTE — PROGRESS NOTES
Home Oxygen Evaluation completed. Patient is on room air, SpO2 95%. SpO2 on room air with exercise = 87%  SpO2 on 2L nc oxygen with exercise = 92%    Nocturnal Oximetry with patient on room air is recommended is SpO2 is between 89% and 95% (requires additional order).     Lynne Travis RCP  2:16 PM

## 2021-09-21 NOTE — CARE COORDINATION
Qualified for home O2. Faxed face sheet, testing, O2 order, face to face, and MAR to Christine.  Talked with Franck Sandhu

## 2021-09-21 NOTE — PLAN OF CARE
Problem: Airway Clearance - Ineffective  Goal: Achieve or maintain patent airway  9/20/2021 2312 by Bina Aleman RN  Outcome: Ongoing  9/20/2021 1556 by Jerel rTinidad RN  Outcome: Ongoing     Problem: Gas Exchange - Impaired  Goal: Absence of hypoxia  9/20/2021 2312 by Bina Aleman RN  Outcome: Ongoing  9/20/2021 1556 by Jerel Trinidad RN  Outcome: Ongoing  Goal: Promote optimal lung function  9/20/2021 2312 by Bina Aleman RN  Outcome: Ongoing  9/20/2021 1556 by Jerel Trinidad RN  Outcome: Ongoing     Problem: Breathing Pattern - Ineffective  Goal: Ability to achieve and maintain a regular respiratory rate  9/20/2021 2312 by Bina Aleman RN  Outcome: Ongoing  9/20/2021 1556 by Jerel Trinidad RN  Outcome: Ongoing     Problem:  Body Temperature -  Risk of, Imbalanced  Goal: Ability to maintain a body temperature within defined limits  9/20/2021 2312 by Bina Aleman RN  Outcome: Ongoing  9/20/2021 1556 by Jerel Trinidad RN  Outcome: Ongoing  Goal: Will regain or maintain usual level of consciousness  9/20/2021 2312 by Bina Aleman RN  Outcome: Ongoing  9/20/2021 1556 by Jerel Trinidad RN  Outcome: Ongoing  Goal: Complications related to the disease process, condition or treatment will be avoided or minimized  9/20/2021 2312 by Bina Aleman RN  Outcome: Ongoing  9/20/2021 1556 by Jerel Trinidad RN  Outcome: Ongoing     Problem: Isolation Precautions - Risk of Spread of Infection  Goal: Prevent transmission of infection  9/20/2021 2312 by Bina Aleman RN  Outcome: Ongoing  9/20/2021 1556 by Jerel Trinidad RN  Outcome: Ongoing     Problem: Nutrition Deficits  Goal: Optimize nutritional status  9/20/2021 2312 by Bina Aleman RN  Outcome: Ongoing  9/20/2021 1556 by Jerel Trinidad RN  Outcome: Ongoing     Problem: Risk for Fluid Volume Deficit  Goal: Maintain normal heart rhythm  9/20/2021 2312 by Bina Aleman RN  Outcome: Ongoing  9/20/2021 1556 by Jerel Trinidad RN  Outcome: Ongoing  Goal: Maintain absence of muscle cramping  9/20/2021 2312 by Rosanne Gooden RN  Outcome: Ongoing  9/20/2021 1556 by Shy Silveira RN  Outcome: Ongoing  Goal: Maintain normal serum potassium, sodium, calcium, phosphorus, and pH  9/20/2021 2312 by Rosanne Gooden RN  Outcome: Ongoing  9/20/2021 1556 by Shy Silveira RN  Outcome: Ongoing     Problem: Loneliness or Risk for Loneliness  Goal: Demonstrate positive use of time alone when socialization is not possible  9/20/2021 2312 by Rosanne Gooden RN  Outcome: Ongoing  9/20/2021 1556 by Shy Silveira RN  Outcome: Ongoing     Problem: Fatigue  Goal: Verbalize increase energy and improved vitality  9/20/2021 2312 by Rosanne Gooden RN  Outcome: Ongoing  9/20/2021 1556 by Shy Silveira RN  Outcome: Ongoing     Problem: Patient Education: Go to Patient Education Activity  Goal: Patient/Family Education  9/20/2021 2312 by Rosanne Gooden RN  Outcome: Ongoing  9/20/2021 1556 by Shy Silveira RN  Outcome: Ongoing

## 2021-09-21 NOTE — CARE COORDINATION
EICU    Called regarding 78 yo f s/p code/resp arrest with multifocal infarctions on brain imaging with lung cancer (charts states enlarging lung mass, but pt was getting XRT so most likely bxed) for her HCO3 9.  Has been 10-11 last couple days, anon gap 18, creat 4.4, lactates have been in mid 2's.  Pt is intubated and there has been concern for herniation, family refused ventriculostomy from nsgy, but not ready to go to comfort care  - no HCO3 gtt at this point, pH 7.23  - pall care best intervention most likely   Patient was evaluated today for the diagnosis of Hypoxia secondary to COVID-19 viral infection. I entered a DME order for home oxygen because the diagnosis and testing requires the patient to have supplemental oxygen. Condition will improve or be benefited by oxygen use. The patient is  able to perform good mobility in a home setting and therefore does require the use of a portable oxygen system. The need for this equipment was discussed with the patient and she understands and is in agreement.     SAMMIE Encinas

## 2021-09-21 NOTE — CARE COORDINATION
Discharge [facility] Case Management Department  Written by: Selma Sainz RN    Patient Name: David Muhammad  Attending Provider: No att. providers found  Admit Date: 2021  6:22 PM  MRN: [id number]  Account: [de-identified]                     : 1970  Discharge Date: 2021      Disposition: home with O2 from Mary Michel RN

## 2021-09-21 NOTE — DISCHARGE SUMMARY
Pt discharged home via personal vehicle. Pt home oxygen delivered to floor. Discharge instructions discussed with pt, pt verbalized understanding. Meds delivered to bedside prior to discharge. No concerns voiced by pt. All lines discontinued, all personal belongings sent with pt.

## 2021-09-21 NOTE — PROGRESS NOTES
CLINICAL PHARMACY NOTE: MEDS TO BEDS    Total # of Prescriptions Filled: 5   The following medications were delivered to the patient:  · Ascorbic acid  · Robafen dm  · Albuterol  · Dexamethasone  · Vitamin d    Additional Documentation:    Paid with card on [facility].

## 2021-09-22 ENCOUNTER — CARE COORDINATION (OUTPATIENT)
Dept: CASE MANAGEMENT | Age: 51
End: 2021-09-22

## 2021-09-22 NOTE — CARE COORDINATION
you scheduled your follow up appointment?: Yes (Comment:  PCP)  How are you going to get to your appointment?: Car - family or friend to transport  Were you discharged with any Home Care or Post Acute Services: No  Do you feel like you have everything you need to keep you well at home?: Yes  Care Transitions Interventions       Patient contacted regarding COVID-19 - diagnosis. Discussed COVID-19 related testing which was available at this time. Test results were positive. Patient informed of results, if available? Yes. Not vaccinated. Care Transition Nurse contacted the patient by telephone to perform post discharge assessment. Call within 2 business days of discharge: Yes. Verified name and  with patient as identifiers. Provided introduction to self, and explanation of the CTN/ACM role, and reason for call due to risk factors for infection and/or exposure to COVID-19. Symptoms reviewed with patient who verbalized the following symptoms: cough, shortness of breath, no new symptoms and no worsening symptoms. Due to no new or worsening symptoms encounter was not routed to provider for escalation. Discussed follow-up appointments. If no appointment was previously scheduled, appointment scheduling offered: Yes. [facility] follow up appointment(s): No future appointments. Non-[facility] follow up appointment(s): PCP Celeste Bragg     Non-face-to-face services provided:  Scheduled appointment with PCP-  Obtained and reviewed discharge summary and/or continuity of care documents     Advance Care Planning:   Does patient have an Advance Directive:  referral to internal ACP facilitator. Educated patient about risk for severe COVID-19 due to risk factors according to CDC guidelines. CTN reviewed discharge instructions, medical action plan and red flag symptoms with the patient who verbalized understanding. Discussed COVID vaccination status: Yes. Has not been vaccinated.  Education provided on COVID-19 vaccination as appropriate. Discussed exposure protocols and quarantine with CDC Guidelines. Patient was given an opportunity to verbalize any questions and concerns and agrees to contact CTN or health care provider for questions related to their healthcare. Reviewed and educated patient on any new and changed medications related to discharge diagnosis     Was patient discharged with a pulse oximeter? No, but already had one @ home -discharged with oxygen. Discussed and confirmed pulse oximeter discharge instructions and when to notify provider or seek emergency care. CTN provided contact information. Plan for follow-up call in 5-7 days based on severity of symptoms and risk factors.   Follow Up  PCP - 9/28  Larissa Moya RN

## 2021-09-23 ENCOUNTER — CARE COORDINATION (OUTPATIENT)
Dept: CARE COORDINATION | Age: 51
End: 2021-09-23

## 2021-09-23 NOTE — CARE COORDINATION
Advance Care Planning    Ambulatory ACP Specialist Patient Outreach    Date:  9/23/2021  ACP Specialist:  Trudy Reynoso    Outreach call to patient in follow-up to ACP Specialist referral from: Dee Sherman MD/RUBIN/Amena Gaines    [x] PCP  [] Provider   [x] Ambulatory Care Management [] Other for Reason:        [x] Early ACP Decision-Making   [] Advance Directive Assistance   [] Discuss Goals of Care   [] Code Status Discussion   [] Completion of Portable DNR order   [] Complete POST/MOST   [] Other (Specify)    Date Referral Received: 09/22/21    Today's Outreach:  [x] First   [] Second  [] Third                               Third outreach made by []  phone  [] email []   [employer]     Intervention:  [x] Spoke with Patient  [] Left VM requesting return call      Outcome: ACP Specialist spoke with patient, patient is unsure of interest  In the ACP process. ACP Specialist will follow up with patient in two weeks for her decision, on the ACP process. Next Step:   [] ACP scheduled conversation  [] Outreach again in one week               [] Email / Mail ACP Info Sheets  [] Email / Mail Advance Directive            [] Close Referral. Routing closure to referring provider/staff and to ACP Specialist .      Thank you for this referral.

## 2021-09-25 LAB
CULTURE: NORMAL
CULTURE: NORMAL
Lab: NORMAL
Lab: NORMAL
SPECIMEN DESCRIPTION: NORMAL
SPECIMEN DESCRIPTION: NORMAL

## 2021-09-30 ENCOUNTER — CARE COORDINATION (OUTPATIENT)
Dept: CASE MANAGEMENT | Age: 51
End: 2021-09-30

## 2021-09-30 NOTE — CARE COORDINATION
[facility] Transitions Follow Up Call    2021    Patient: Nael Koehler  Patient : 1970   MRN: [id number]  Reason for Admission: COVID PNE  Discharge Date: 21 RARS: Readmission Risk Score: 13         Spoke with: ,Paddy she is doing good, denies chest pain, SOB, cough , fever, chills, headache. Nausea. , congestion. She is staying hydrated normal bowel and bladder elimination, her taste is okay smell is starting to return saw PCP the other day and returns to work on 10/7/21 no concerns noted. Care Transitions Follow Up Call    Needs to be reviewed by the provider   Additional needs identified to be addressed with provider: No  none             Method of communication with provider : none      Care Transition Nurse (CTN) contacted the patient by telephone to follow up after admission on 21. Verified name and  with patient as identifiers. Addressed changes since last contact: none  Discussed follow-up appointments. If no appointment was previously scheduled, appointment scheduling offered: Yes. Is follow up appointment scheduled within 7 days of discharge? Yes. Advance Care Planning:   Does patient have an Advance Directive: reviewed and current, reviewed and needs to be updated, not on file; education provided, patient declined education, decision maker updated and referral to internal ACP facilitator. CTN reviewed discharge instructions, medical action plan and red flags with patient and discussed any barriers to care and/or understanding of plan of care after discharge. Discussed appropriate site of care based on symptoms and resources available to patient including: PCP. The patient agrees to contact the PCP office for questions related to their healthcare.      Patients top risk factors for readmission: lack of knowledge about disease  Interventions to address risk factors: Obtained and reviewed discharge summary and/or continuity of care documents          CTN provided contact information for future needs. Plan for follow-up call in 5-7 days based on severity of symptoms and risk factors. Plan for next call: symptom management-continued to have no symptoms of fever, chills cough          Care Transitions Subsequent and Final Call    Subsequent and Final Calls  Do you have any ongoing symptoms?: No  Have your medications changed?: No  Do you have any questions related to your medications?: No  Do you currently have any active services?: No  Do you have any needs or concerns that I can assist you with?: No  Identified Barriers: Lack of Education, Other  Care Transitions Interventions  Other Interventions: Follow Up  No future appointments.     Madyson Santiago LPN

## 2021-10-06 ENCOUNTER — CARE COORDINATION (OUTPATIENT)
Dept: CASE MANAGEMENT | Age: 51
End: 2021-10-06

## 2021-10-06 NOTE — CARE COORDINATION
[facility] Transitions Follow Up Call    10/6/2021    Patient: Mya Daniels  Patient : 1970   MRN: <[id number]>  Reason for Admission: Pneumonia due to COVID-19 virus  Discharge Date: 21 RARS: Readmission Risk Score: 13         Spoke with: Jermaine states she is doing fine. Pt. Denies dyspnea. Pulse ox 95-96%. Occasional productive cough with clear phlegm. No longer uses oxygen and sent her oxygen machine back. Uses inhaler as needed. No fever. Appetite and fluid intake is good. Does not check glucose levels. Not diabetic per patient. Bowel and bladder WNL. Pt. States she was seen by her PCP Dr. Moshe Melgar last week. No new issues or concerns. Will continue to follow. Care Transitions Follow Up Call    Needs to be reviewed by the provider   Additional needs identified to be addressed with provider: No  none             Method of communication with provider : none      Care Transition Nurse (CTN) contacted the patient by telephone to follow up after admission on 21. Verified name and  with patient as identifiers. Addressed changes since last contact: none  Discussed follow-up appointments. If no appointment was previously scheduled, appointment scheduling offered: completed   Is follow up appointment scheduled within 7 days of discharge? Yes. Advance Care Planning:   Does patient have an Advance Directive: reviewed and current. CTN reviewed discharge instructions, medical action plan and red flags with patient and discussed any barriers to care and/or understanding of plan of care after discharge. Discussed appropriate site of care based on symptoms and resources available to patient including: PCP and When to call 911. The patient agrees to contact the PCP office for questions related to their healthcare.      Patients top risk factors for readmission: medical condition-covid 19  Interventions to address risk factors: Obtained and reviewed discharge summary and/or continuity of care documents      Non-[facility] follow up appointment(s): Dr Sherrine Meckel    CTN provided contact information for future needs. Plan for follow-up call in 7-10 days based on severity of symptoms and risk factors. Plan for next call: symptom management-dyspnea  cough  fever            Care Transitions Subsequent and Final Call    Subsequent and Final Calls  Care Transitions Interventions  Other Interventions: Follow Up  No future appointments.     KYLAH Diaz LPN Care Transitions Nurse   [phone]

## 2021-10-14 ENCOUNTER — CARE COORDINATION (OUTPATIENT)
Dept: CASE MANAGEMENT | Age: 51
End: 2021-10-14

## 2021-10-15 NOTE — CARE COORDINATION
[facility] Trans[facility] Follow Up Call  - No Answer/Busy - unable to leave VM - required access code - no other contact number listed    10/15/2021    Patient: Janette Gtz    Patient : 1970   MRN: [id number]    Reason for Admission: COVID PNE  Discharge Date: 21   RARS: Readmission Risk Score: 13         Alissa Sparrow RN

## 2021-10-20 ENCOUNTER — CARE COORDINATION (OUTPATIENT)
Dept: CASE MANAGEMENT | Age: 51
End: 2021-10-20

## 2021-10-20 NOTE — CARE COORDINATION
[facility] Transitions Follow Up Call    10/20/2021    Patient: Maura Gonsalves    Patient : 1970   MRN: [id number]    Reason for Admission: COVID PNE  Discharge Date: 21   RARS: Readmission Risk Score: 15         Spoke with: patient- reports doing well - just some fatigue - back to work - no issues. No longer needs oxygen    Patient resolved from the Care Transitions episode  Discussed COVID-19 related testing which was positive - COVID PNE    Patient/family has been provided the following resources and education related to COVID-19:                         Signs, symptoms and red flags related to COVID-19            CDC exposure and quarantine guidelines            [facility] exposure contact - [phone] & Health Dept hotline            Contact for their local Department of Health                 Patient currently reports that the following symptoms have improved: - residual fatigue is only symptom     No further outreach scheduled with this CTN/ACM. Episode of Care resolved. Patient has this CTN/ACM contact information if future needs arise.   PCP appt     Pita Rizo RN

## 2022-08-19 ENCOUNTER — APPOINTMENT (OUTPATIENT)
Dept: CT IMAGING | Age: 52
End: 2022-08-19
Payer: COMMERCIAL

## 2022-08-19 ENCOUNTER — APPOINTMENT (OUTPATIENT)
Dept: GENERAL RADIOLOGY | Age: 52
End: 2022-08-19
Payer: COMMERCIAL

## 2022-08-19 ENCOUNTER — HOSPITAL ENCOUNTER (EMERGENCY)
Age: 52
Discharge: HOME | End: 2022-08-20
Attending: EMERGENCY MEDICINE
Payer: COMMERCIAL

## 2022-08-19 DIAGNOSIS — N83.201 RIGHT OVARIAN CYST: Primary | ICD-10-CM

## 2022-08-19 LAB
ABSOLUTE EOS #: 0.15 K/UL (ref 0–0.44)
ABSOLUTE IMMATURE GRANULOCYTE: 0.15 K/UL (ref 0–0.3)
ABSOLUTE LYMPH #: 1.61 K/UL (ref 1.1–3.7)
ABSOLUTE MONO #: 1.02 K/UL (ref 0.1–1.2)
ALBUMIN SERPL-MCNC: 4.4 G/DL (ref 3.5–5.2)
ALBUMIN/GLOBULIN RATIO: 1.4 (ref 1–2.5)
ALP BLD-CCNC: 62 U/L (ref 35–104)
ALT SERPL-CCNC: 7 U/L (ref 5–33)
ANION GAP SERPL CALCULATED.3IONS-SCNC: 9 MMOL/L (ref 9–17)
AST SERPL-CCNC: 18 U/L
BASOPHILS # BLD: 0 % (ref 0–2)
BASOPHILS ABSOLUTE: 0 K/UL (ref 0–0.2)
BILIRUB SERPL-MCNC: 0.32 MG/DL (ref 0.3–1.2)
BILIRUBIN DIRECT: 0.11 MG/DL
BILIRUBIN, INDIRECT: 0.21 MG/DL (ref 0–1)
BUN BLDV-MCNC: 10 MG/DL (ref 6–20)
CALCIUM SERPL-MCNC: 9.1 MG/DL (ref 8.6–10.4)
CHLORIDE BLD-SCNC: 103 MMOL/L (ref 98–107)
CO2: 24 MMOL/L (ref 20–31)
CREAT SERPL-MCNC: 0.75 MG/DL (ref 0.5–0.9)
EOSINOPHILS RELATIVE PERCENT: 1 % (ref 1–4)
GFR AFRICAN AMERICAN: >60 ML/MIN
GFR NON-AFRICAN AMERICAN: >60 ML/MIN
GFR SERPL CREATININE-BSD FRML MDRD: ABNORMAL ML/MIN/{1.73_M2}
GLUCOSE BLD-MCNC: 107 MG/DL (ref 70–99)
HCT VFR BLD CALC: 33.3 % (ref 36.3–47.1)
HEMOGLOBIN: 9.7 G/DL (ref 11.9–15.1)
IMMATURE GRANULOCYTES: 1 %
LACTIC ACID, WHOLE BLOOD: 1.3 MMOL/L (ref 0.7–2.1)
LIPASE: 17 U/L (ref 13–60)
LYMPHOCYTES # BLD: 11 % (ref 24–43)
MCH RBC QN AUTO: 21 PG (ref 25.2–33.5)
MCHC RBC AUTO-ENTMCNC: 29.1 G/DL (ref 28.4–34.8)
MCV RBC AUTO: 71.9 FL (ref 82.6–102.9)
MONOCYTES # BLD: 7 % (ref 3–12)
MORPHOLOGY: ABNORMAL
NRBC AUTOMATED: 0 PER 100 WBC
PDW BLD-RTO: 20.2 % (ref 11.8–14.4)
PLATELET # BLD: 459 K/UL (ref 138–453)
PMV BLD AUTO: 9.8 FL (ref 8.1–13.5)
POTASSIUM SERPL-SCNC: 3.8 MMOL/L (ref 3.7–5.3)
RBC # BLD: 4.63 M/UL (ref 3.95–5.11)
SEG NEUTROPHILS: 80 % (ref 36–65)
SEGMENTED NEUTROPHILS ABSOLUTE COUNT: 11.67 K/UL (ref 1.5–8.1)
SODIUM BLD-SCNC: 136 MMOL/L (ref 135–144)
TOTAL PROTEIN: 7.6 G/DL (ref 6.4–8.3)
TROPONIN, HIGH SENSITIVITY: <6 NG/L (ref 0–14)
WBC # BLD: 14.6 K/UL (ref 3.5–11.3)

## 2022-08-19 PROCEDURE — 81001 URINALYSIS AUTO W/SCOPE: CPT

## 2022-08-19 PROCEDURE — 80048 BASIC METABOLIC PNL TOTAL CA: CPT

## 2022-08-19 PROCEDURE — 2580000003 HC RX 258: Performed by: STUDENT IN AN ORGANIZED HEALTH CARE EDUCATION/TRAINING PROGRAM

## 2022-08-19 PROCEDURE — 85025 COMPLETE CBC W/AUTO DIFF WBC: CPT

## 2022-08-19 PROCEDURE — 85610 PROTHROMBIN TIME: CPT

## 2022-08-19 PROCEDURE — 96365 THER/PROPH/DIAG IV INF INIT: CPT

## 2022-08-19 PROCEDURE — 71045 X-RAY EXAM CHEST 1 VIEW: CPT

## 2022-08-19 PROCEDURE — 96375 TX/PRO/DX INJ NEW DRUG ADDON: CPT

## 2022-08-19 PROCEDURE — 84484 ASSAY OF TROPONIN QUANT: CPT

## 2022-08-19 PROCEDURE — 87086 URINE CULTURE/COLONY COUNT: CPT

## 2022-08-19 PROCEDURE — 80076 HEPATIC FUNCTION PANEL: CPT

## 2022-08-19 PROCEDURE — 96366 THER/PROPH/DIAG IV INF ADDON: CPT

## 2022-08-19 PROCEDURE — 99285 EMERGENCY DEPT VISIT HI MDM: CPT

## 2022-08-19 PROCEDURE — 87040 BLOOD CULTURE FOR BACTERIA: CPT

## 2022-08-19 PROCEDURE — 6360000004 HC RX CONTRAST MEDICATION: Performed by: STUDENT IN AN ORGANIZED HEALTH CARE EDUCATION/TRAINING PROGRAM

## 2022-08-19 PROCEDURE — 74177 CT ABD & PELVIS W/CONTRAST: CPT

## 2022-08-19 PROCEDURE — 93005 ELECTROCARDIOGRAM TRACING: CPT | Performed by: STUDENT IN AN ORGANIZED HEALTH CARE EDUCATION/TRAINING PROGRAM

## 2022-08-19 PROCEDURE — 6370000000 HC RX 637 (ALT 250 FOR IP): Performed by: STUDENT IN AN ORGANIZED HEALTH CARE EDUCATION/TRAINING PROGRAM

## 2022-08-19 PROCEDURE — 85730 THROMBOPLASTIN TIME PARTIAL: CPT

## 2022-08-19 PROCEDURE — 83690 ASSAY OF LIPASE: CPT

## 2022-08-19 PROCEDURE — 83605 ASSAY OF LACTIC ACID: CPT

## 2022-08-19 PROCEDURE — 84703 CHORIONIC GONADOTROPIN ASSAY: CPT

## 2022-08-19 RX ORDER — 0.9 % SODIUM CHLORIDE 0.9 %
1000 INTRAVENOUS SOLUTION INTRAVENOUS ONCE
Status: COMPLETED | OUTPATIENT
Start: 2022-08-19 | End: 2022-08-20

## 2022-08-19 RX ORDER — ACETAMINOPHEN 500 MG
1000 TABLET ORAL ONCE
Status: COMPLETED | OUTPATIENT
Start: 2022-08-19 | End: 2022-08-19

## 2022-08-19 RX ORDER — SODIUM CHLORIDE 0.9 % (FLUSH) 0.9 %
3 SYRINGE (ML) INJECTION EVERY 8 HOURS
Status: DISCONTINUED | OUTPATIENT
Start: 2022-08-19 | End: 2022-08-20 | Stop reason: HOSPADM

## 2022-08-19 RX ADMIN — Medication 3 ML: at 22:12

## 2022-08-19 RX ADMIN — SODIUM CHLORIDE 1000 ML: 9 INJECTION, SOLUTION INTRAVENOUS at 23:08

## 2022-08-19 RX ADMIN — IOPAMIDOL 75 ML: 755 INJECTION, SOLUTION INTRAVENOUS at 22:49

## 2022-08-19 RX ADMIN — ACETAMINOPHEN 1000 MG: 500 TABLET ORAL at 22:12

## 2022-08-20 ENCOUNTER — APPOINTMENT (OUTPATIENT)
Dept: ULTRASOUND IMAGING | Age: 52
End: 2022-08-20
Payer: COMMERCIAL

## 2022-08-20 VITALS
HEIGHT: 63 IN | OXYGEN SATURATION: 94 % | DIASTOLIC BLOOD PRESSURE: 67 MMHG | HEART RATE: 85 BPM | BODY MASS INDEX: 42.52 KG/M2 | TEMPERATURE: 99.4 F | SYSTOLIC BLOOD PRESSURE: 138 MMHG | WEIGHT: 240 LBS | RESPIRATION RATE: 23 BRPM

## 2022-08-20 LAB
BILIRUBIN URINE: NEGATIVE
CASTS UA: ABNORMAL /LPF (ref 0–8)
COLOR: YELLOW
CULTURE: NORMAL
EKG ATRIAL RATE: 93 BPM
EKG P AXIS: 34 DEGREES
EKG P-R INTERVAL: 148 MS
EKG Q-T INTERVAL: 424 MS
EKG QRS DURATION: 68 MS
EKG QTC CALCULATION (BAZETT): 527 MS
EKG R AXIS: -6 DEGREES
EKG T AXIS: 61 DEGREES
EKG VENTRICULAR RATE: 93 BPM
EPITHELIAL CELLS UA: ABNORMAL /HPF (ref 0–5)
GLUCOSE URINE: NEGATIVE
HCG QUALITATIVE: NEGATIVE
INR BLD: 0.9
KETONES, URINE: NEGATIVE
LEUKOCYTE ESTERASE, URINE: ABNORMAL
NITRITE, URINE: NEGATIVE
PARTIAL THROMBOPLASTIN TIME: 26.3 SEC (ref 20.5–30.5)
PH UA: 5.5 (ref 5–8)
PROTEIN UA: NEGATIVE
PROTHROMBIN TIME: 10.1 SEC (ref 9.1–12.3)
RBC UA: ABNORMAL /HPF (ref 0–4)
SPECIFIC GRAVITY UA: 1.06 (ref 1–1.03)
SPECIMEN DESCRIPTION: NORMAL
TURBIDITY: CLEAR
URINE HGB: NEGATIVE
UROBILINOGEN, URINE: NORMAL
WBC UA: ABNORMAL /HPF (ref 0–5)

## 2022-08-20 PROCEDURE — 93010 ELECTROCARDIOGRAM REPORT: CPT | Performed by: INTERNAL MEDICINE

## 2022-08-20 PROCEDURE — 93975 VASCULAR STUDY: CPT

## 2022-08-20 PROCEDURE — 76830 TRANSVAGINAL US NON-OB: CPT

## 2022-08-20 PROCEDURE — 6360000002 HC RX W HCPCS: Performed by: STUDENT IN AN ORGANIZED HEALTH CARE EDUCATION/TRAINING PROGRAM

## 2022-08-20 RX ORDER — MAGNESIUM SULFATE IN WATER 40 MG/ML
2000 INJECTION, SOLUTION INTRAVENOUS ONCE
Status: COMPLETED | OUTPATIENT
Start: 2022-08-20 | End: 2022-08-20

## 2022-08-20 RX ORDER — KETOROLAC TROMETHAMINE 30 MG/ML
30 INJECTION, SOLUTION INTRAMUSCULAR; INTRAVENOUS ONCE
Status: COMPLETED | OUTPATIENT
Start: 2022-08-20 | End: 2022-08-20

## 2022-08-20 RX ADMIN — KETOROLAC TROMETHAMINE 30 MG: 30 INJECTION, SOLUTION INTRAMUSCULAR at 00:53

## 2022-08-20 RX ADMIN — MAGNESIUM SULFATE HEPTAHYDRATE 2000 MG: 40 INJECTION, SOLUTION INTRAVENOUS at 00:53

## 2022-08-20 ASSESSMENT — ENCOUNTER SYMPTOMS
CHOKING: 0
VOMITING: 0
ABDOMINAL PAIN: 1
PHOTOPHOBIA: 0
BACK PAIN: 0
NAUSEA: 1

## 2022-08-20 NOTE — ED NOTES
The following labs labeled with pt sticker and tubed to lab:     [] Blue     [] Lavender   [] on ice  [] Green/yellow  [] Green/black [] on ice  [] Cathalene Pluck  [] on ice  [] Yellow  [] Red  [] Pink  [] VBG  [] VBG    [] COVID-19 swab    [] Rapid  [] PCR  [] Flu swab  [] Peds Viral Panel     [] Urine Sample  [] Pelvic Cultures  [x] Blood Cultures   [] STREP Cultures       Janelle Haynes RN  08/19/22 [id number]

## 2022-08-20 NOTE — ED NOTES
The following labs labeled with pt sticker and tubed to lab:     [x] Blue     [x] Lavender   [] on ice  [x] Green/yellow  [x] Green/black [] on ice  [] Elfreda Lapine  [] on ice  [] Yellow  [x] Red  [] Pink  [] VBG  [] VBG    [] COVID-19 swab    [] Rapid  [] PCR  [] Flu swab  [] Peds Viral Panel     [] Urine Sample  [] Pelvic Cultures  [] Blood Cultures   [] STREP Cultures         Naima Frias RN  08/19/22 1395

## 2022-08-20 NOTE — ED PROVIDER NOTES
[facility]  Emergency Department  Faculty Attestation     I performed a history and physical examination of the patient and discussed management with the resident. I reviewed the residents note and agree with the documented findings and plan of care. Any areas of disagreement are noted on the chart. I was personally present for the key portions of any procedures. I have documented in the chart those procedures where I was not present during the key portions. I have reviewed the emergency nurses triage note. I agree with the chief complaint, past medical history, past surgical history, allergies, medications, social and family history as documented unless otherwise noted below. For Physician Assistant/ Nurse Practitioner cases/documentation I have personally evaluated this patient and have completed at least one if not all key elements of the E/M (history, physical exam, and MDM). Additional findings are as noted. Primary Care Physician:  Yari Pruett MD    Screenings:  [unfilled]    CHIEF COMPLAINT     No chief complaint on file. RECENT VITALS:   Temp: (!) 100.9 °F (38.3 °C),  Heart Rate: 91, Resp: 16, BP: (!) 154/108    LABS:  Labs Reviewed - No data to display    Radiology  No orders to display   . EKG:  EKG Interpretation    Interpreted by me    Rhythm: normal sinus   Rate: normal  Axis: Left  Ectopy: none  Conduction: normal  ST Segments: no acute change  T Waves: Inversion high lateral  Q Waves: none    Clinical Impression: LVH with strain    Attending Physician Additional  Notes    Patient's been ill for the past day with fatigue, dizziness, fever, myalgias. No nausea or vomiting. No dysuria frequency hematuria. No flank pain. No cough rhinorrhea or congestion. She is not COVID vaccinated but had COVID last year and states this feels different. There is mild right lower quadrant abdominal pain.   On exam she is low-grade temperature, minimally hypertensive, uncomfortable appearing, afebrile, no respiratory distress, normal saturations. Lungs are clear. Abdomen is soft. There is mild tenderness on the inferior and lateral aspect of the pannus of the abdomen. No McBurney's point tenderness. Negative Rovsing sign. Negative heeltap and obturator. No pain with movement of the right lower extremity such as groin pain. No CVA tenderness. Impression is febrile illness, possible dehydration, consider COVID, rule out UTI or appendicitis. Plan is IV access, fluids, antipyretics, simple analgesics, laboratory studies, CT imaging, reassess. If she is admitted she will be agreeable to getting a COVID assay. Anticipate discharge with follow-up. Joesph Camejo.  Norma Herrera MD, [facility]  Attending Emergency  Physician                Jany Beltran MD  08/19/22 [id number]

## 2022-08-20 NOTE — ED PROVIDER NOTES
[facility]  ED  Emergency Department Encounter  Emergency Medicine Resident     Pt Name: Spenser Ramirez  MRN: [id number]  [place] 1970  Date of evaluation: 8/19/22  PCP:  Katlyn Kelley MD    CHIEF COMPLAINT       No chief complaint on file. HISTORY OFPRESENT ILLNESS  (Location/Symptom, Timing/Onset, Context/Setting, Quality, Duration, Modifying Factors,Severity.)      Spenser Ramirez is a 47 y. o.yo female who presents with right lower quadrant abdominal pain with fever and chills and dizziness that started yesterday per progressively worsening. Patient reports the pain level is 5 out of 10. Patient reports that she is also been having generalized body aches. Felt nauseous however has not vomited yet. Patient reports that she still has her appendix. Patient reports that her LMP 2 weeks ago, she is status post tubal ligation years ago  She also complained of urinary frequency but no dysuria hematuria. PAST MEDICAL / SURGICAL / SOCIAL / FAMILY HISTORY      has a past medical history of Hypertension. has a past surgical history that includes Tubal ligation.      Social History     Socioeconomic History    Marital status:      Spouse name: Not on file    Number of children: Not on file    Years of education: Not on file    Highest education level: Not on file   Occupational History    Not on file   Tobacco Use    Smoking status: Former    Smokeless tobacco: Not on file   Substance and Sexual Activity    Alcohol use: No    Drug use: No    Sexual activity: Not on file   Other Topics Concern    Not on file   Social History Narrative    Not on file     Social Determinants of Health     Financial Resource Strain: Not on file   Food Insecurity: Not on file   Transportation Needs: Not on file   Physical Activity: Not on file   Stress: Not on file   Social Connections: Not on file   Intimate Partner Violence: Not on file   Housing Stability: Not on file       Family History   Problem Relation Age of Onset    Heart Failure Mother     Diabetes Mother         Allergies:  Bactrim [sulfamethoxazole-trimethoprim]    Home Medications:  Prior to Admission medications    Medication Sig Start Date End Date Taking? Authorizing Provider   albuterol sulfate  (90 Base) MCG/ACT inhaler Inhale 2 puffs into the lungs every 6 hours as needed for Wheezing 9/21/21   SAMMIE Degroot NP   ascorbic acid (VITAMIN C) 500 MG tablet Take 1 tablet by mouth 2 times daily  Patient not taking: No sig reported 9/21/21   SAMMIE Degroot NP   Vitamin D (CHOLECALCIFEROL) 50 MCG (2000 UT) TABS tablet Take 1 tablet by mouth daily 9/22/21 11/21/21  SAMMIE Degroot NP   Blood Glucose Monitoring Suppl (ONE TOUCH ULTRA 2) w/Device KIT 1 kit by Does not apply route daily 9/21/21   SAMMIE Degroot NP   Lancets MISC 1 each by Does not apply route daily 9/21/21   SAMMIE Degroot NP   blood glucose monitor strips Test one time a day & as needed for symptoms of irregular blood glucose. Dispense sufficient amount for indicated testing frequency plus additional to accommodate PRN testing needs. 9/21/21   SAMMIE Degroot NP       REVIEW OFSYSTEMS    (2-9 systems for level 4, 10 or more for level 5)      Review of Systems   Constitutional:  Negative for fatigue and fever. HENT:  Negative for congestion and dental problem. Eyes:  Negative for photophobia and visual disturbance. Respiratory:  Negative for choking. Cardiovascular:  Negative for chest pain and leg swelling. Gastrointestinal:  Positive for abdominal pain and nausea. Negative for vomiting. Genitourinary:  Positive for frequency. Negative for dysuria. Musculoskeletal:  Negative for back pain. Skin:  Negative for rash and wound. Neurological:  Positive for dizziness. Psychiatric/Behavioral:  Negative for behavioral problems and confusion.       PHYSICAL EXAM   (up to 7 for level 4, 8 or more forlevel 5)      INITIAL VITALS: ED Triage Vitals [08/19/22 2118]   BP Temp Temp src Heart Rate Resp SpO2 Height Weight   (!) 154/108 (!) 100.9 °F (38.3 °C) -- 91 16 97 % 5' 3\" (1.6 m) 240 lb (108.9 kg)       Physical Exam  Vitals reviewed. Constitutional:       Appearance: Normal appearance. HENT:      Head: Normocephalic and atraumatic. Nose: Nose normal.      Mouth/Throat:      Mouth: Mucous membranes are moist.   Eyes:      Extraocular Movements: Extraocular movements intact. Pupils: Pupils are equal, round, and reactive to light. Cardiovascular:      Rate and Rhythm: Normal rate. Pulses: Normal pulses. Pulmonary:      Effort: Pulmonary effort is normal. No respiratory distress. Abdominal:      Tenderness: There is abdominal tenderness. Musculoskeletal:         General: No swelling. Normal range of motion. Cervical back: Normal range of motion. Skin:     General: Skin is warm. Coloration: Skin is not jaundiced. Neurological:      General: No focal deficit present. Mental Status: She is alert.    Psychiatric:         Mood and Affect: Mood normal.         Behavior: Behavior normal.       DIFFERENTIAL  DIAGNOSIS     PLAN (LABS / IMAGING / EKG):  Orders Placed This Encounter   Procedures    Urine Culture    Culture, Blood 1    Culture, Blood 1    CT ABDOMEN PELVIS W IV CONTRAST Additional Contrast? None    XR CHEST PORTABLE    US NON OB TRANSVAGINAL    US DUP ABD PEL RETRO SCROT COMPLETE    CBC with Diff    Lipase    Lactic Acid (Select if patient is over 65 to rule out mesenteric ischemia)    Basic Metabolic Panel    Hepatic Function Panel    Urinalysis with Microscopic    Troponin    HCG Qualitative, Serum    Protime-INR    APTT    EKG 12 Lead    Saline lock IV       MEDICATIONS ORDERED:  Orders Placed This Encounter   Medications    DISCONTD: sodium chloride flush 0.9 % injection 3 mL    acetaminophen (TYLENOL) tablet 1,000 mg    0.9 % sodium chloride bolus    iopamidol (ISOVUE-370) 76 % injection 75 mL    magnesium sulfate 2000 mg in 50 mL IVPB premix    ketorolac (TORADOL) injection 30 mg         Initial MDM/Plan: 47 y.o. female who presents with lower quadrant abdominal pain with fever of 100.9 Fahrenheit, mildly tachypneic, normotensive. Does have right lower quadrant abdominal tenderness to palpate, no guarding but she does have rebound. And that she still has her appendix, will plan to obtain CT to rule out appendicitis, will also obtain a CBC, metabolic panel, LFTs, UA with urine culture, blood culture. Patient given Tylenol for fever also given IV fluids  Dispo is pending on her labs and imaging    Sepsis Times and Checklist  Vital Signs: BP: 138/67  Heart Rate: 85  Resp: 23  Temp: 99.4 °F (37.4 °C) SpO2: 94 %  SIRS (>2) Non Pregnant       Temp > 38.3C or < 36C   HR > 90   RR > 20   WBC > 12 or < 4 or >10% bands  SIRS (>2) Pregnant 20 weeks until 3 days postpartum   Temp > 38C or <36C   HR >110   RR > 24   WBC >15 or < 4 or >10% bands   SIRS (>2) and confirmed or suspected source of infection = Sepsis  Is Sepsis due to likely bacterial infection?: unknown    Sepsis Identified:  Date: 8/19/2022   Time: 21:48  Sepsis Orders:   CBC(required): Yes   CMP: Yes   PT/PTT: Yes   Blood Cultures x2(required): Yes   Urinalysis and Urine Culture: Yes   Lactate(required):  Yes   Antibiotics Given (within 3 hours of sepsis identification, after blood cultures):  No    (If unable to obtain IV access for IV antibiotics within 3 hours of identification of sepsis, IM antibiotics is acceptable.)              If lactate >2.0 MUST repeat within 6 hours     IV Fluid Bolus:  Is lactate > 4.0:  No      DIAGNOSTIC RESULTS / EMERGENCYDEPARTMENT COURSE / MDM     LABS:  Labs Reviewed   CBC WITH AUTO DIFFERENTIAL - Abnormal; Notable for the following components:       Result Value    WBC 14.6 (*)     Hemoglobin 9.7 (*)     Hematocrit 33.3 (*)     MCV 71.9 (*)     MCH 21.0 (*)     RDW 20.2 (*)     Platelets 935 (*) Immature Granulocytes 1 (*)     Seg Neutrophils 80 (*)     Lymphocytes 11 (*)     Segs Absolute 11.67 (*)     All other components within normal limits   BASIC METABOLIC PANEL - Abnormal; Notable for the following components:    Glucose 107 (*)     All other components within normal limits   URINALYSIS WITH MICROSCOPIC - Abnormal; Notable for the following components:    Specific Gravity, UA 1.056 (*)     Leukocyte Esterase, Urine SMALL (*)     All other components within normal limits   CULTURE, URINE   CULTURE, BLOOD 1   CULTURE, BLOOD 1   LIPASE   LACTIC ACID   HEPATIC FUNCTION PANEL   TROPONIN   HCG, SERUM, QUALITATIVE   PROTIME-INR   APTT         RADIOLOGY:  No results found. EKG  EKG Interpretation    Interpreted by me    Rhythm: normal sinus   Rate: normal  Axis: normal  Ectopy: none  Conduction: normal  ST Segments: no acute change  T Waves: no acute change  Q Waves: none    Clinical Impression: Sinus rhythm, left ventricular hypertrophy,     All EKG's are interpreted by the Emergency Department Physicianwho either signs or Co-signs this chart in the absence of a cardiologist.    EMERGENCY DEPARTMENT COURSE:  ED Course as of 08/26/22 1425   Sat Aug 20, 2022   0029 QTC of 517, pt given 2g of magnesium. CT abdomen and pelvis shows Normal appendix posteroinferior to cecum. Nonspecific small amount of gas with fluid levels scattered in small bowel. Right ovary is in high position, appearing enlarged with 2 cysts, 1 measuring  2.4 cm and a 2nd cyst measuring 2.1 cm, without any surrounding fluid or  inflammation. A fibroid in myometrium in right side of the body of the  uterus. Normal appearing left ovary. No free fluid in the pelvic cul-de-sac. Follow-up evaluation with complete pelvic ultrasound, suggested. RECOMMENDATIONS:  Complete pelvic ultrasound, with attention to the right ovary, suggested.  [AN]   0031 US ordered [AN]      ED Course User Index  [AN] Marilou Rincon MD

## 2022-08-20 NOTE — ED NOTES
Pt presents to the ER from triage ambulatory. Pt states she has felt dizzy with associated chills since yesterday. Pt states she also has RLQ abdominal pain. Pt states she doesn't have any chest pain or SOB. Pt states nausea, w/o vomiting. Pt A&O x4, in NAD otherwise, ekg taken, line established, labs drawn. Resident at bedside, call light placed within reach. Pt states no previous appendix issues and states she still has her gall bladder.      Maryellen Burnett RN  08/19/22 4431

## 2022-08-20 NOTE — ED NOTES
The following labs labeled with pt sticker and tubed to lab:     [] Blue     [] Lavender   [] on ice  [] Green/yellow  [] Green/black [] on ice  [] Rebecca Pardon  [] on ice  [] Yellow  [] Red  [] Pink  [] VBG  [] VBG    [] COVID-19 swab    [] Rapid  [] PCR  [] Flu swab  [] Peds Viral Panel     [x] Urine Sample  [] Pelvic Cultures  [] Blood Cultures   [] STREP Cultures         John Mazariegos RN  08/20/22 0025

## 2022-08-20 NOTE — DISCHARGE INSTRUCTIONS
XR CHEST PORTABLE   Final Result   No acute process. CT ABDOMEN PELVIS W IV CONTRAST Additional Contrast? None   Preliminary Result   Normal appendix posteroinferior to cecum. Nonspecific small amount of gas with fluid levels scattered in small bowel. Right ovary is in high position, appearing enlarged with 2 cysts, 1 measuring   2.4 cm and a 2nd cyst measuring 2.1 cm, without any surrounding fluid or   inflammation. A fibroid in myometrium in right side of the body of the   uterus. Normal appearing left ovary. No free fluid in the pelvic cul-de-sac. Follow-up evaluation with complete pelvic ultrasound, suggested. RECOMMENDATIONS:   Complete pelvic ultrasound, with attention to the right ovary, suggested.          US NON OB TRANSVAGINAL    (Results Pending)   US DUP ABD PEL RETRO SCROT COMPLETE    (Results Pending)

## 2022-08-22 NOTE — ED PROVIDER NOTES
This note is a late chart entry due to [facility] downtime. Please see downtime charting for details of vitals, medications, nursing information, and other documentation. [address]   Emergency Department  Emergency Medicine Attending Sign-out     Care of [place] was assumed from previous attending Dr. Lew Addison and is being seen for No chief complaint on file. .  The patient's initial evaluation and plan have been discussed with the prior provider who initially evaluated the patient. Attestation  I was available and discussed any additional care issues that arose and coordinated the management plans with the resident(s) caring for the patient during my duty period. Any areas of disagreement with resident's documentation of care or procedures are noted on the chart. I was personally present for the key portions of any/all procedures, during my duty period. I have documented in the chart those procedures where I was not present during the key portions. BRIEF PATIENT SUMMARY/MDM COURSE PER INITIAL PROVIDER:   RECENT VITALS:     Temp: 99.4 °F (37.4 °C),  Heart Rate: 85, Resp: 23, BP: 138/67, SpO2: 94 %    This patient is a 47 y.o. Female with fatigue, dizziness, fevers, and myalgias. Found to have RLQ tenderness. CT scan with questionable ovarian findings.      DIAGNOSTICS/MEDICATIONS:     MEDICATIONS GIVEN:  ED Medication Orders (From admission, onward)      Start Ordered     Status Ordering Provider    08/20/22 0045 08/20/22 0031  ketorolac (TORADOL) injection 30 mg  ONCE         Last MAR action: Given - by Karsonon Caller on 08/20/22 at 0053 Chintan ARCE    08/20/22 0015 08/20/22 0006  magnesium sulfate 2000 mg in 50 mL IVPB premix  ONCE         Last MAR action: Stopped - by Karsonon Caller on 08/20/22 at 0250 DARIA FARMER    08/19/22 2245 08/19/22 2240  0.9 % sodium chloride bolus  ONCE         Last MAR action: Stopped - by Karsonon Caller on 08/20/22 at [facility] DARIA FARMER    08/19/22 2241 08/19/22 2241  iopamidol (ISOVUE-370) 76 % injection 75 mL  IMG ONCE PRN         Last MAR action: Given - by Margarita Sanchez on 08/19/22 at 2249 DARIA RÍOS    08/19/22 2200 08/19/22 2152  acetaminophen (TYLENOL) tablet 1,000 mg  ONCE         Last MAR action: Given - by Vy Winston on 08/19/22 at 2212 DARIA FARMER            LABS    Labs Reviewed   CBC WITH AUTO DIFFERENTIAL - Abnormal; Notable for the following components:       Result Value    WBC 14.6 (*)     Hemoglobin 9.7 (*)     Hematocrit 33.3 (*)     MCV 71.9 (*)     MCH 21.0 (*)     RDW 20.2 (*)     Platelets 711 (*)     Immature Granulocytes 1 (*)     Seg Neutrophils 80 (*)     Lymphocytes 11 (*)     Segs Absolute 11.67 (*)     All other components within normal limits   BASIC METABOLIC PANEL - Abnormal; Notable for the following components:    Glucose 107 (*)     All other components within normal limits   URINALYSIS WITH MICROSCOPIC - Abnormal; Notable for the following components:    Specific Gravity, UA 1.056 (*)     Leukocyte Esterase, Urine SMALL (*)     All other components within normal limits   CULTURE, URINE   CULTURE, BLOOD 1   CULTURE, BLOOD 1   LIPASE   LACTIC ACID   HEPATIC FUNCTION PANEL   TROPONIN   HCG, SERUM, QUALITATIVE   PROTIME-INR   APTT       RADIOLOGY  US NON OB TRANSVAGINAL    Result Date: 8/20/2022  EXAMINATION: PELVIC ULTRASOUND; ULTRASOUND OF THE SCROTUM/TESTICLES WITH COLOR DOPPLER FLOW EVALUATION 8/20/2022 TECHNIQUE: Transvaginal pelvic ultrasound duplex ultrasound using B-mode/gray scaled imaging, Doppler spectral analysis and color flow Doppler was obtained. COMPARISON: 08/19/2022 HISTORY: ORDERING SYSTEM PROVIDED HISTORY: r/o torsion TECHNOLOGIST PROVIDED HISTORY: r/o torsion Right lower quadrant pain.  FINDINGS: Measurements: Uterus: 10.9 x 5.8 x 5.3 cm Endometrial stripe: 8.6 mm Right Ovary:2.8 x 2.9 x 1.9 cm Left Ovary: 2.1 x 2.9 x 1.7 cm Ultrasound Findings: Uterus: Uterus demonstrates normal myometrial echotexture. Known fibroids are not well visualized on this exam. Endometrial stripe: Endometrial stripe is within normal limits. Right Ovary: 2.0 x 0.9 cm simple appearing cyst.  There is normal arterial and venous Doppler flow. Left Ovary:  Left ovary is within normal limits. There is normal arterial and venous Doppler flow. Free Fluid: Small amount of free fluid in the posterior cul-de-sac. Normal Doppler flow to the ovaries. 2.0 x 0.9 cm simple appearing cyst in the right ovary, likely a corpus luteum correlating with prior CT. No further imaging follow-up is required. CT ABDOMEN PELVIS W IV CONTRAST Additional Contrast? None    Result Date: 8/20/2022  EXAMINATION: CT OF THE ABDOMEN AND PELVIS WITH CONTRAST 8/19/2022 9:40 pm TECHNIQUE: CT of the abdomen and pelvis was performed with the administration of intravenous contrast. Multiplanar reformatted images are provided for review. Automated exposure control, iterative reconstruction, and/or weight based adjustment of the mA/kV was utilized to reduce the radiation dose to as low as reasonably achievable. COMPARISON: None. HISTORY: ORDERING SYSTEM PROVIDED HISTORY: Right lower quadrant abd pain TECHNOLOGIST PROVIDED HISTORY: Right lower quadrant abd pain Decision Support Exception - unselect if not a suspected or confirmed emergency medical condition->Emergency Medical Condition (MA) FINDINGS: Lower Chest: No acute process of the bases of the lungs. No evidence of pleural effusion. No hiatal hernia. No pneumoperitoneum. Organs: Normal liver, gallbladder, spleen, pancreas, bilateral adrenal glands and left kidney. At the upper medial cortex of the right kidney there are tiny 5 mm size cortical cyst.  No other diagnostic finding in the right kidney. Bilateral ureters are of normal size without evidence of stone or obstruction. No stone or focal abnormality in the bladder. GI/Bowel: Nondiagnostic findings stomach. Small amount of gas scattered in small bowel with multiple small fluid levels. There is gas within the lumen of the terminal ileum without any surrounding inflammatory change. Normal appendix posteroinferior to cecum. Moderate to large amount of stool in right colon. Small amount of stool and gas scattered in the transverse colon. Minimal gas and stool in descending colon, sigmoid colon and rectum. Mild diverticulosis coli of the descending colon and proximal sigmoid colon, without evidence of diverticulitis. No evidence of colitis. Pelvis: No abnormal fluid collection in the pelvis. There is a fibroid in the right side of the body of the uterus and it measures 2.6 cm. Normal left ovary. Right ovary is in high location. There are a few cysts in the right ovary. One cyst in the right ovary measures 2.1 cm and a second cyst in the right ovary measures 2.4 cm. Right ovary is enlarged, measuring 4.2 cm x 3.1 cm x 4.4 cm, without any surrounding fluid or inflammation. No free fluid in pelvic cul-de-sac. Peritoneum/Retroperitoneum: No evidence of periaortic or mesenteric pathologic lymphadenopathy. No ascites. No retroperitoneal abnormality. Bones/Soft Tissues: No diagnostic findings. Normal appendix posteroinferior to cecum. Nonspecific small amount of gas with fluid levels scattered in small bowel. Right ovary is in high position, appearing enlarged with two cysts, one measuring 2.4 cm and a second cyst measuring 2.1 cm, without any surrounding fluid or inflammation. A fibroid in myometrium in right side of the body of the uterus. Normal appearing left ovary. No free fluid in the pelvic cul-de-sac. Follow-up evaluation with complete pelvic ultrasound, suggested. RECOMMENDATIONS: Complete pelvic ultrasound, with attention to the right ovary, suggested.      XR CHEST PORTABLE    Result Date: 8/20/2022  EXAMINATION: ONE XRAY VIEW OF THE CHEST 8/19/2022 11:36 pm COMPARISON: 09/19/2021 HISTORY: ORDERING SYSTEM PROVIDED HISTORY: fever and myalgia TECHNOLOGIST PROVIDED HISTORY: fever and myalgia Reason for Exam: upr,fever,dizzy,abd pain FINDINGS: No focal consolidations. No pleural effusion or pneumothorax. Heart size is stable. No acute process. US DUP ABD PEL RETRO SCROT COMPLETE    Result Date: 8/20/2022  EXAMINATION: PELVIC ULTRASOUND; ULTRASOUND OF THE SCROTUM/TESTICLES WITH COLOR DOPPLER FLOW EVALUATION 8/20/2022 TECHNIQUE: Transvaginal pelvic ultrasound duplex ultrasound using B-mode/gray scaled imaging, Doppler spectral analysis and color flow Doppler was obtained. COMPARISON: 08/19/2022 HISTORY: ORDERING SYSTEM PROVIDED HISTORY: r/o torsion TECHNOLOGIST PROVIDED HISTORY: r/o torsion Right lower quadrant pain. FINDINGS: Measurements: Uterus: 10.9 x 5.8 x 5.3 cm Endometrial stripe: 8.6 mm Right Ovary:2.8 x 2.9 x 1.9 cm Left Ovary: 2.1 x 2.9 x 1.7 cm Ultrasound Findings: Uterus: Uterus demonstrates normal myometrial echotexture. Known fibroids are not well visualized on this exam. Endometrial stripe: Endometrial stripe is within normal limits. Right Ovary: 2.0 x 0.9 cm simple appearing cyst.  There is normal arterial and venous Doppler flow. Left Ovary:  Left ovary is within normal limits. There is normal arterial and venous Doppler flow. Free Fluid: Small amount of free fluid in the posterior cul-de-sac. Normal Doppler flow to the ovaries. 2.0 x 0.9 cm simple appearing cyst in the right ovary, likely a corpus luteum correlating with prior CT. No further imaging follow-up is required.        OUTSTANDING TASKS / ADDITIONAL COMMENTS   US with right ovarian cyst  Ok for d/c     Chadwick Garces MD  Emergency Medicine Attending  [facility]       Marck Ariza MD  08/22/22 [id number]

## 2023-05-22 ENCOUNTER — HOSPITAL ENCOUNTER (OUTPATIENT)
Age: 53
Setting detail: SPECIMEN
Discharge: HOME | End: 2023-05-22

## 2023-05-22 LAB
BILIRUB UR QL STRIP: NEGATIVE
CLARITY UR: CLEAR
COLOR UR: YELLOW
COMMENT UA: NORMAL
GLUCOSE UR STRIP-MCNC: NEGATIVE MG/DL
HGB UR QL STRIP.AUTO: NEGATIVE
KETONES UR STRIP-MCNC: NEGATIVE MG/DL
LEUKOCYTE ESTERASE UR QL STRIP: NEGATIVE
NITRITE UR QL STRIP: NEGATIVE
PH UR STRIP: 7.5 [PH] (ref 5–8)
PROT UR STRIP-MCNC: NEGATIVE MG/DL
SP GR UR STRIP: 1.02 (ref 1–1.03)
UROBILINOGEN UR STRIP-ACNC: NORMAL

## 2023-05-23 ENCOUNTER — HOSPITAL ENCOUNTER (OUTPATIENT)
Age: 53
Setting detail: SPECIMEN
Discharge: HOME | End: 2023-05-23

## 2023-05-23 LAB
ALBUMIN SERPL-MCNC: 3.8 G/DL (ref 3.5–5.2)
ALBUMIN/GLOB SERPL: 1.3 {RATIO} (ref 1–2.5)
ALP SERPL-CCNC: 64 U/L (ref 35–104)
ALT SERPL-CCNC: 16 U/L (ref 5–33)
ANION GAP SERPL CALCULATED.3IONS-SCNC: 19 MMOL/L (ref 9–17)
AST SERPL-CCNC: 35 U/L
BILIRUB SERPL-MCNC: 0.2 MG/DL (ref 0.3–1.2)
BUN SERPL-MCNC: 10 MG/DL (ref 6–20)
CALCIUM SERPL-MCNC: 8.7 MG/DL (ref 8.6–10.4)
CHLORIDE SERPL-SCNC: 105 MMOL/L (ref 98–107)
CK SERPL-CCNC: 165 U/L (ref 26–192)
CO2 SERPL-SCNC: 17 MMOL/L (ref 20–31)
CREAT SERPL-MCNC: 0.64 MG/DL (ref 0.5–0.9)
ERYTHROCYTE [DISTWIDTH] IN BLOOD BY AUTOMATED COUNT: 19.8 % (ref 11.8–14.4)
ERYTHROCYTE [SEDIMENTATION RATE] IN BLOOD BY WESTERGREN METHOD: 22 MM/HR (ref 0–30)
GFR SERPL CREATININE-BSD FRML MDRD: >60 ML/MIN/1.73M2
GLUCOSE SERPL-MCNC: 103 MG/DL (ref 70–99)
HCT VFR BLD AUTO: 36.2 % (ref 36.3–47.1)
HGB BLD-MCNC: 10.4 G/DL (ref 11.9–15.1)
MAGNESIUM SERPL-MCNC: 1.7 MG/DL (ref 1.6–2.6)
MCH RBC QN AUTO: 22.4 PG (ref 25.2–33.5)
MCHC RBC AUTO-ENTMCNC: 28.7 G/DL (ref 28.4–34.8)
MCV RBC AUTO: 77.8 FL (ref 82.6–102.9)
NRBC AUTOMATED: 0 PER 100 WBC
PLATELET # BLD AUTO: 415 K/UL (ref 138–453)
PMV BLD AUTO: 10.9 FL (ref 8.1–13.5)
POTASSIUM SERPL-SCNC: 4.2 MMOL/L (ref 3.7–5.3)
PROT SERPL-MCNC: 6.7 G/DL (ref 6.4–8.3)
RBC # BLD AUTO: 4.65 M/UL (ref 3.95–5.11)
SODIUM SERPL-SCNC: 141 MMOL/L (ref 135–144)
TSH SERPL-MCNC: 1.62 UIU/ML (ref 0.3–5)
WBC OTHER # BLD: 6.4 K/UL (ref 3.5–11.3)

## 2023-05-24 LAB
EST. AVERAGE GLUCOSE BLD GHB EST-MCNC: 126 MG/DL
HBA1C MFR BLD: 6 % (ref 4–6)

## 2023-08-07 ENCOUNTER — HOSPITAL ENCOUNTER (OUTPATIENT)
Dept: WOMENS IMAGING | Age: 53
Discharge: HOME | End: 2023-08-09
Payer: COMMERCIAL

## 2023-08-07 DIAGNOSIS — Z12.31 BREAST CANCER SCREENING BY MAMMOGRAM: ICD-10-CM

## 2023-08-07 PROCEDURE — 77063 BREAST TOMOSYNTHESIS BI: CPT

## 2023-09-29 ENCOUNTER — HOSPITAL ENCOUNTER (OUTPATIENT)
Age: 53
Setting detail: SPECIMEN
Discharge: HOME | End: 2023-09-29

## 2023-09-29 LAB
ALBUMIN SERPL-MCNC: 4.3 G/DL (ref 3.5–5.2)
ALBUMIN/GLOB SERPL: 1.3 {RATIO} (ref 1–2.5)
ALP SERPL-CCNC: 62 U/L (ref 35–104)
ALT SERPL-CCNC: 12 U/L (ref 5–33)
ANION GAP SERPL CALCULATED.3IONS-SCNC: 14 MMOL/L (ref 9–17)
AST SERPL-CCNC: 21 U/L
BASOPHILS # BLD: 0.07 K/UL (ref 0–0.2)
BASOPHILS NFR BLD: 1 % (ref 0–2)
BILIRUB SERPL-MCNC: 0.2 MG/DL (ref 0.3–1.2)
BILIRUB UR QL STRIP: NEGATIVE
BUN SERPL-MCNC: 11 MG/DL (ref 6–20)
CALCIUM SERPL-MCNC: 9.5 MG/DL (ref 8.6–10.4)
CASTS #/AREA URNS LPF: NORMAL /LPF (ref 0–8)
CHLORIDE SERPL-SCNC: 104 MMOL/L (ref 98–107)
CHOLEST SERPL-MCNC: 167 MG/DL
CHOLESTEROL/HDL RATIO: 4.4
CLARITY UR: CLEAR
CO2 SERPL-SCNC: 22 MMOL/L (ref 20–31)
COLOR UR: YELLOW
CREAT SERPL-MCNC: 0.6 MG/DL (ref 0.5–0.9)
EOSINOPHIL # BLD: 0.2 K/UL (ref 0–0.44)
EOSINOPHILS RELATIVE PERCENT: 3 % (ref 1–4)
EPI CELLS #/AREA URNS HPF: NORMAL /HPF (ref 0–5)
ERYTHROCYTE [DISTWIDTH] IN BLOOD BY AUTOMATED COUNT: 20.1 % (ref 11.8–14.4)
EST. AVERAGE GLUCOSE BLD GHB EST-MCNC: 117 MG/DL
GFR SERPL CREATININE-BSD FRML MDRD: >60 ML/MIN/1.73M2
GLUCOSE SERPL-MCNC: 85 MG/DL (ref 70–99)
GLUCOSE UR STRIP-MCNC: NEGATIVE MG/DL
HBA1C MFR BLD: 5.7 % (ref 4–6)
HCT VFR BLD AUTO: 38.7 % (ref 36.3–47.1)
HDLC SERPL-MCNC: 38 MG/DL
HGB BLD-MCNC: 11 G/DL (ref 11.9–15.1)
HGB UR QL STRIP.AUTO: ABNORMAL
IMM GRANULOCYTES # BLD AUTO: 0.07 K/UL (ref 0–0.3)
IMM GRANULOCYTES NFR BLD: 1 %
KETONES UR STRIP-MCNC: NEGATIVE MG/DL
LDLC SERPL CALC-MCNC: 97 MG/DL (ref 0–130)
LEUKOCYTE ESTERASE UR QL STRIP: ABNORMAL
LYMPHOCYTES NFR BLD: 2.02 K/UL (ref 1.1–3.7)
LYMPHOCYTES RELATIVE PERCENT: 31 % (ref 24–43)
MCH RBC QN AUTO: 22.2 PG (ref 25.2–33.5)
MCHC RBC AUTO-ENTMCNC: 28.4 G/DL (ref 28.4–34.8)
MCV RBC AUTO: 78 FL (ref 82.6–102.9)
MONOCYTES NFR BLD: 0.46 K/UL (ref 0.1–1.2)
MONOCYTES NFR BLD: 7 % (ref 3–12)
MORPHOLOGY: ABNORMAL
NEUTROPHILS NFR BLD: 57 % (ref 36–65)
NEUTS SEG NFR BLD: 3.68 K/UL (ref 1.5–8.1)
NITRITE UR QL STRIP: NEGATIVE
NRBC BLD-RTO: 0 PER 100 WBC
PH UR STRIP: 6 [PH] (ref 5–8)
PLATELET # BLD AUTO: 509 K/UL (ref 138–453)
PMV BLD AUTO: 9.8 FL (ref 8.1–13.5)
POTASSIUM SERPL-SCNC: 4.4 MMOL/L (ref 3.7–5.3)
PROT SERPL-MCNC: 7.5 G/DL (ref 6.4–8.3)
PROT UR STRIP-MCNC: NEGATIVE MG/DL
RBC # BLD AUTO: 4.96 M/UL (ref 3.95–5.11)
RBC #/AREA URNS HPF: NORMAL /HPF (ref 0–4)
SODIUM SERPL-SCNC: 140 MMOL/L (ref 135–144)
SP GR UR STRIP: 1.02 (ref 1–1.03)
TRIGL SERPL-MCNC: 160 MG/DL
UROBILINOGEN UR STRIP-ACNC: NORMAL EU/DL (ref 0–1)
WBC #/AREA URNS HPF: NORMAL /HPF (ref 0–5)
WBC OTHER # BLD: 6.5 K/UL (ref 3.5–11.3)

## 2023-10-08 LAB — CYTOLOGY REPORT: NORMAL

## 2023-12-08 ENCOUNTER — HOSPITAL ENCOUNTER (EMERGENCY)
Age: 53
Discharge: HOME | End: 2023-12-08
Attending: EMERGENCY MEDICINE
Payer: COMMERCIAL

## 2023-12-08 VITALS
HEART RATE: 83 BPM | SYSTOLIC BLOOD PRESSURE: 176 MMHG | WEIGHT: 240 LBS | RESPIRATION RATE: 18 BRPM | HEIGHT: 62 IN | TEMPERATURE: 97.4 F | OXYGEN SATURATION: 97 % | DIASTOLIC BLOOD PRESSURE: 100 MMHG | BODY MASS INDEX: 44.16 KG/M2

## 2023-12-08 DIAGNOSIS — J02.0 STREP PHARYNGITIS: Primary | ICD-10-CM

## 2023-12-08 LAB
SPECIMEN SOURCE: ABNORMAL
STREP A, MOLECULAR: POSITIVE

## 2023-12-08 PROCEDURE — 6370000000 HC RX 637 (ALT 250 FOR IP)

## 2023-12-08 PROCEDURE — 99283 EMERGENCY DEPT VISIT LOW MDM: CPT

## 2023-12-08 PROCEDURE — 87651 STREP A DNA AMP PROBE: CPT

## 2023-12-08 RX ORDER — AMOXICILLIN 875 MG/1
875 TABLET, COATED ORAL 2 TIMES DAILY
Qty: 14 TABLET | Refills: 0 | Status: SHIPPED | OUTPATIENT
Start: 2023-12-08 | End: 2023-12-15

## 2023-12-08 RX ORDER — AMOXICILLIN 875 MG/1
875 TABLET, COATED ORAL EVERY 12 HOURS SCHEDULED
Status: DISCONTINUED | OUTPATIENT
Start: 2023-12-08 | End: 2023-12-08

## 2023-12-08 RX ORDER — PREDNISONE 20 MG/1
50 TABLET ORAL ONCE
Status: COMPLETED | OUTPATIENT
Start: 2023-12-08 | End: 2023-12-08

## 2023-12-08 RX ORDER — PREDNISONE 50 MG/1
50 TABLET ORAL DAILY
Qty: 4 TABLET | Refills: 0 | Status: SHIPPED | OUTPATIENT
Start: 2023-12-08 | End: 2023-12-12

## 2023-12-08 RX ORDER — AMOXICILLIN 875 MG/1
875 TABLET, COATED ORAL ONCE
Status: COMPLETED | OUTPATIENT
Start: 2023-12-08 | End: 2023-12-08

## 2023-12-08 RX ADMIN — AMOXICILLIN 875 MG: 875 TABLET, FILM COATED ORAL at 16:51

## 2023-12-08 RX ADMIN — PREDNISONE 50 MG: 20 TABLET ORAL at 16:51

## 2023-12-08 ASSESSMENT — ENCOUNTER SYMPTOMS
NAUSEA: 0
SHORTNESS OF BREATH: 0
WHEEZING: 0
STRIDOR: 0
CHOKING: 0
CONSTIPATION: 0
TROUBLE SWALLOWING: 1
ABDOMINAL PAIN: 0
VOICE CHANGE: 1
COUGH: 0
SORE THROAT: 1
VOMITING: 0
DIARRHEA: 0

## 2023-12-08 NOTE — ED NOTES
Pt to ED for pharyngitis x2 days. Pt states, \"This is the worst sore throat of my life. \" Pt states she took tylenol at home but it isn't helping. No fever or chills. No other symptoms. Patient alert and oriented x4, talking in complete sentences. Respirations even and unlabored.  Call light in reach, all needs met at this time     Bibi Villanueva, [address]  12/08/23 6710

## 2024-03-05 ENCOUNTER — OFFICE VISIT (OUTPATIENT)
Dept: OBGYN CLINIC | Age: 54
End: 2024-03-05
Payer: COMMERCIAL

## 2024-03-05 VITALS
DIASTOLIC BLOOD PRESSURE: 92 MMHG | WEIGHT: 246 LBS | BODY MASS INDEX: 44.99 KG/M2 | HEART RATE: 79 BPM | SYSTOLIC BLOOD PRESSURE: 157 MMHG

## 2024-03-05 DIAGNOSIS — N93.9 ABNORMAL UTERINE BLEEDING (AUB): Primary | ICD-10-CM

## 2024-03-05 DIAGNOSIS — N83.209 CYST OF OVARY, UNSPECIFIED LATERALITY: ICD-10-CM

## 2024-03-05 DIAGNOSIS — N92.0 MENORRHAGIA WITH REGULAR CYCLE: ICD-10-CM

## 2024-03-05 DIAGNOSIS — D50.0 IRON DEFICIENCY ANEMIA DUE TO CHRONIC BLOOD LOSS: ICD-10-CM

## 2024-03-05 PROCEDURE — 3080F DIAST BP >= 90 MM HG: CPT | Performed by: STUDENT IN AN ORGANIZED HEALTH CARE EDUCATION/TRAINING PROGRAM

## 2024-03-05 PROCEDURE — 3077F SYST BP >= 140 MM HG: CPT | Performed by: STUDENT IN AN ORGANIZED HEALTH CARE EDUCATION/TRAINING PROGRAM

## 2024-03-05 PROCEDURE — 99203 OFFICE O/P NEW LOW 30 MIN: CPT | Performed by: STUDENT IN AN ORGANIZED HEALTH CARE EDUCATION/TRAINING PROGRAM

## 2024-03-05 RX ORDER — OMEPRAZOLE 20 MG/1
CAPSULE, DELAYED RELEASE ORAL
COMMUNITY
Start: 2024-03-01

## 2024-03-05 RX ORDER — METHOCARBAMOL 750 MG/1
TABLET ORAL
COMMUNITY
Start: 2024-01-11

## 2024-03-05 NOTE — PROGRESS NOTES
chart is from 9/29/23 and was 11   - Most recent HgbA1C is from 9/29/23 and was 5.7   - Hgb A1C, Hgb and TSH ordered, will follow these results   - Reviewed pelvic ultrasound from Aug 2022 noting uterine fibroids and right ovarian cyst  - Will repeat pelvic US to assess any growth in fibroids or if any new GYN pathology is present that could cause this change in her cycle   - Due to the heaviness in her period, with her age and BMI will have her back in the office for endometrial biopsy once US results are back. Reviewed expectations for outpatient biopsy. Instructed to take motrin prior to appointment   - Once biopsy results are back, will further discuss if she wants to proceed with medical or surgical management   - Discussed expected age of menopause   - Will await US and lab work and see her back in office for EMB   - She was agreeable to plan     Patient Active Problem List    Diagnosis Date Noted    Acute respiratory failure with hypoxia (HCC) 09/21/2021    Elevated C-reactive protein (CRP) 09/21/2021    Elevated LDH 09/21/2021    Elevated d-dimer 09/21/2021    Hyperglycemia 09/21/2021    Morbid obesity (HCC) 09/21/2021    Sepsis due to COVID-19 (HCC) 09/21/2021    Hypertension     Pneumonia due to COVID-19 virus 09/19/2021       Counseling:  Repeat Annual every 1 year  Cervical Cytology Evaluation begins at 21 years old.  If Negative Cytology, Follow-up screening per current guidelines.   Counseled on preventative health maintenance follow-up.  Orders Placed This Encounter   Procedures    US NON OB TRANSVAGINAL     Begin with transabdominal imaging.     Standing Status:   Future     Standing Expiration Date:   3/5/2025     Order Specific Question:   Reason for exam:     Answer:   heavy periods lasting 15 days, passing large clots, hx of ovarian cyst    US PELVIS COMPLETE     This procedure can be scheduled via [employer].  Access your [employer] account by visiting [email or web address].     Standing Status:   Future

## 2024-06-20 ENCOUNTER — PROCEDURE VISIT (OUTPATIENT)
Dept: OBGYN CLINIC | Age: 54
End: 2024-06-20

## 2024-06-20 ENCOUNTER — HOSPITAL ENCOUNTER (OUTPATIENT)
Age: 54
Setting detail: SPECIMEN
Discharge: HOME | End: 2024-06-20

## 2024-06-20 VITALS
WEIGHT: 246 LBS | SYSTOLIC BLOOD PRESSURE: 138 MMHG | BODY MASS INDEX: 42 KG/M2 | DIASTOLIC BLOOD PRESSURE: 86 MMHG | HEIGHT: 64 IN | HEART RATE: 68 BPM

## 2024-06-20 DIAGNOSIS — N93.9 ABNORMAL UTERINE BLEEDING (AUB): Primary | ICD-10-CM

## 2024-06-20 DIAGNOSIS — D50.0 IRON DEFICIENCY ANEMIA DUE TO CHRONIC BLOOD LOSS: ICD-10-CM

## 2024-06-20 DIAGNOSIS — N93.9 ABNORMAL UTERINE BLEEDING (AUB): ICD-10-CM

## 2024-06-20 DIAGNOSIS — N92.0 MENORRHAGIA WITH REGULAR CYCLE: ICD-10-CM

## 2024-06-20 LAB
BASOPHILS # BLD: 0.05 K/UL (ref 0–0.2)
BASOPHILS NFR BLD: 1 % (ref 0–2)
EOSINOPHIL # BLD: 0.3 K/UL (ref 0–0.44)
EOSINOPHILS RELATIVE PERCENT: 4 % (ref 1–4)
ERYTHROCYTE [DISTWIDTH] IN BLOOD BY AUTOMATED COUNT: 18.9 % (ref 11.8–14.4)
EST. AVERAGE GLUCOSE BLD GHB EST-MCNC: 131 MG/DL
HBA1C MFR BLD: 6.2 % (ref 4–6)
HCT VFR BLD AUTO: 34.9 % (ref 36.3–47.1)
HGB BLD-MCNC: 10.2 G/DL (ref 11.9–15.1)
IMM GRANULOCYTES # BLD AUTO: 0.12 K/UL (ref 0–0.3)
IMM GRANULOCYTES NFR BLD: 2 %
LYMPHOCYTES NFR BLD: 1.8 K/UL (ref 1.1–3.7)
LYMPHOCYTES RELATIVE PERCENT: 23 % (ref 24–43)
MCH RBC QN AUTO: 22.5 PG (ref 25.2–33.5)
MCHC RBC AUTO-ENTMCNC: 29.2 G/DL (ref 28.4–34.8)
MCV RBC AUTO: 77 FL (ref 82.6–102.9)
MONOCYTES NFR BLD: 0.54 K/UL (ref 0.1–1.2)
MONOCYTES NFR BLD: 7 % (ref 3–12)
NEUTROPHILS NFR BLD: 63 % (ref 36–65)
NEUTS SEG NFR BLD: 4.96 K/UL (ref 1.5–8.1)
NRBC BLD-RTO: 0 PER 100 WBC
PLATELET # BLD AUTO: 428 K/UL (ref 138–453)
PMV BLD AUTO: 10.3 FL (ref 8.1–13.5)
RBC # BLD AUTO: 4.53 M/UL (ref 3.95–5.11)
RBC # BLD: ABNORMAL 10*6/UL
TSH SERPL DL<=0.05 MIU/L-ACNC: 1.12 UIU/ML (ref 0.27–4.2)
WBC OTHER # BLD: 7.8 K/UL (ref 3.5–11.3)

## 2024-06-20 RX ORDER — UREA 10 %
325 LOTION (ML) TOPICAL 2 TIMES DAILY
Qty: 90 TABLET | Refills: 3 | Status: SHIPPED | OUTPATIENT
Start: 2024-06-20

## 2024-06-20 NOTE — PROGRESS NOTES
difficult to obtain due to cervical stenosis, warned patient that if sample is unsatisfactory, future procedure with a D&C might be indicated to properly sample lining    - Specimen labeled and sent to pathology for evaluation    - Motrin, Tylenol and heating pad for pain control PRN    - Post procedure restrictions were reviewed      - Once lab work and biopsy results are back will call patient to discuss next step in plan     Patient Active Problem List    Diagnosis Date Noted    Acute respiratory failure with hypoxia (HCC) 09/21/2021    Elevated C-reactive protein (CRP) 09/21/2021    Elevated LDH 09/21/2021    Elevated d-dimer 09/21/2021    Hyperglycemia 09/21/2021    Morbid obesity (HCC) 09/21/2021    Sepsis due to COVID-19 (HCC) 09/21/2021    Hypertension     Pneumonia due to COVID-19 virus 09/19/2021        Counseling  The patient was counseled on follow up and home care with restrictions noted. She was instructed to use barrier protection for sexually transmitted disease prevention as well as string checks/timing. She was instructed to abstain for two weeks and use derick-pads for the first 8 weeks post procedure. She is to notify the office or go to the nearest Emergency Department if she experiences Abdominal Pain, Temperatures more than 100.4 F, Odiferous Vaginal Discharge, Dizziness or Shortness of breath.      DO Mar[facility]   6/20/2024, 2:55 PM

## 2024-06-24 LAB — SURGICAL PATHOLOGY REPORT: NORMAL

## 2024-07-16 ENCOUNTER — HOSPITAL ENCOUNTER (OUTPATIENT)
Age: 54
Setting detail: SPECIMEN
Discharge: HOME | End: 2024-07-16

## 2024-07-16 LAB — 25(OH)D3 SERPL-MCNC: 13.2 NG/ML (ref 30–100)

## 2024-09-09 ENCOUNTER — HOSPITAL ENCOUNTER (OUTPATIENT)
Dept: WOMENS IMAGING | Age: 54
Discharge: HOME | End: 2024-09-11
Payer: COMMERCIAL

## 2024-09-09 VITALS — HEIGHT: 64 IN | WEIGHT: 246 LBS | BODY MASS INDEX: 42 KG/M2

## 2024-09-09 DIAGNOSIS — Z12.31 VISIT FOR SCREENING MAMMOGRAM: ICD-10-CM

## 2024-09-09 PROCEDURE — 77063 BREAST TOMOSYNTHESIS BI: CPT

## 2024-09-22 ENCOUNTER — HOSPITAL ENCOUNTER (EMERGENCY)
Age: 54
Discharge: HOME | End: 2024-09-22
Attending: EMERGENCY MEDICINE
Payer: COMMERCIAL

## 2024-09-22 VITALS
RESPIRATION RATE: 17 BRPM | DIASTOLIC BLOOD PRESSURE: 98 MMHG | TEMPERATURE: 98 F | OXYGEN SATURATION: 96 % | HEART RATE: 82 BPM | SYSTOLIC BLOOD PRESSURE: 149 MMHG
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
## 2024-09-22 PROCEDURE — 99283 EMERGENCY DEPT VISIT LOW MDM: CPT

## 2024-09-22 PROCEDURE — 6370000000 HC RX 637 (ALT 250 FOR IP)

## 2024-09-22 RX ORDER — IBUPROFEN 400 MG/1
400 TABLET, FILM COATED ORAL ONCE
Status: COMPLETED | OUTPATIENT
Start: 2024-09-22 | End: 2024-09-22

## 2024-09-22 RX ADMIN — IBUPROFEN 400 MG: 400 TABLET, FILM COATED ORAL at 18:41

## 2024-09-22 ASSESSMENT — PAIN - FUNCTIONAL ASSESSMENT: PAIN_FUNCTIONAL_ASSESSMENT: ACTIVITIES ARE NOT PREVENTED

## 2024-09-22 ASSESSMENT — PAIN SCALES - GENERAL: PAINLEVEL_OUTOF10: 6

## 2024-09-22 ASSESSMENT — PAIN DESCRIPTION - ORIENTATION: ORIENTATION: RIGHT

## 2024-09-22 ASSESSMENT — PAIN DESCRIPTION - LOCATION: LOCATION: HIP

## 2024-09-22 ASSESSMENT — PAIN DESCRIPTION - DESCRIPTORS: DESCRIPTORS: ACHING

## 2024-10-01 ENCOUNTER — HOSPITAL ENCOUNTER (OUTPATIENT)
Age: 54
Setting detail: SPECIMEN
Discharge: HOME | End: 2024-10-01

## 2024-10-01 LAB
ALBUMIN SERPL-MCNC: 4.5 G/DL (ref 3.5–5.2)
ALBUMIN/GLOB SERPL: 2 {RATIO} (ref 1–2.5)
ALP SERPL-CCNC: 62 U/L (ref 35–104)
ALT SERPL-CCNC: 13 U/L (ref 10–35)
ANION GAP SERPL CALCULATED.3IONS-SCNC: 9 MMOL/L (ref 9–16)
AST SERPL-CCNC: 20 U/L (ref 10–35)
BASOPHILS # BLD: 0.05 K/UL (ref 0–0.2)
BASOPHILS NFR BLD: 1 % (ref 0–2)
BILIRUB DIRECT SERPL-MCNC: 0.1 MG/DL (ref 0–0.2)
BILIRUB INDIRECT SERPL-MCNC: 0.2 MG/DL (ref 0–1)
BILIRUB SERPL-MCNC: 0.3 MG/DL (ref 0–1.2)
BUN SERPL-MCNC: 10 MG/DL (ref 6–20)
CALCIUM SERPL-MCNC: 9.5 MG/DL (ref 8.6–10.4)
CHLORIDE SERPL-SCNC: 104 MMOL/L (ref 98–107)
CHOLEST SERPL-MCNC: 134 MG/DL (ref 0–199)
CHOLESTEROL/HDL RATIO: 4
CO2 SERPL-SCNC: 26 MMOL/L (ref 20–31)
CREAT SERPL-MCNC: 0.7 MG/DL (ref 0.5–0.9)
EOSINOPHIL # BLD: 0.15 K/UL (ref 0–0.4)
EOSINOPHILS RELATIVE PERCENT: 3 % (ref 1–4)
ERYTHROCYTE [DISTWIDTH] IN BLOOD BY AUTOMATED COUNT: 24 % (ref 11.8–14.4)
EST. AVERAGE GLUCOSE BLD GHB EST-MCNC: 120 MG/DL
GFR, ESTIMATED: >90 ML/MIN/1.73M2
GLUCOSE SERPL-MCNC: 114 MG/DL (ref 74–99)
HBA1C MFR BLD: 5.8 % (ref 4–6)
HCT VFR BLD AUTO: 38.4 % (ref 36.3–47.1)
HDLC SERPL-MCNC: 35 MG/DL
HGB BLD-MCNC: 11.2 G/DL (ref 11.9–15.1)
IMM GRANULOCYTES # BLD AUTO: 0 K/UL (ref 0–0.3)
IMM GRANULOCYTES NFR BLD: 0 %
LDLC SERPL CALC-MCNC: 72 MG/DL (ref 0–100)
LYMPHOCYTES NFR BLD: 1.27 K/UL (ref 1–4.8)
LYMPHOCYTES RELATIVE PERCENT: 26 % (ref 24–44)
MCH RBC QN AUTO: 22.6 PG (ref 25.2–33.5)
MCHC RBC AUTO-ENTMCNC: 29.2 G/DL (ref 28.4–34.8)
MCV RBC AUTO: 77.4 FL (ref 82.6–102.9)
MONOCYTES NFR BLD: 0.34 K/UL (ref 0.1–0.8)
MONOCYTES NFR BLD: 7 % (ref 1–7)
MORPHOLOGY: ABNORMAL
NEUTROPHILS NFR BLD: 63 % (ref 36–66)
NEUTS SEG NFR BLD: 3.09 K/UL (ref 1.8–7.7)
NRBC BLD-RTO: 0 PER 100 WBC
PLATELET # BLD AUTO: 375 K/UL (ref 138–453)
PMV BLD AUTO: 10.2 FL (ref 8.1–13.5)
POTASSIUM SERPL-SCNC: 3.9 MMOL/L (ref 3.7–5.3)
PROT SERPL-MCNC: 7.1 G/DL (ref 6.6–8.7)
RBC # BLD AUTO: 4.96 M/UL (ref 3.95–5.11)
SODIUM SERPL-SCNC: 139 MMOL/L (ref 136–145)
TRIGL SERPL-MCNC: 137 MG/DL (ref 0–149)
TSH SERPL DL<=0.05 MIU/L-ACNC: 0.95 UIU/ML (ref 0.27–4.2)
VLDLC SERPL CALC-MCNC: 27 MG/DL
WBC OTHER # BLD: 4.9 K/UL (ref 3.5–11.3)

## 2025-01-14 ENCOUNTER — HOSPITAL ENCOUNTER (OUTPATIENT)
Age: 55
Setting detail: SPECIMEN
Discharge: HOME | End: 2025-01-14

## 2025-01-14 LAB
25(OH)D3 SERPL-MCNC: 24.2 NG/ML (ref 30–100)
BASOPHILS # BLD: 0.03 K/UL (ref 0–0.2)
BASOPHILS NFR BLD: 1 % (ref 0–2)
EOSINOPHIL # BLD: 0.24 K/UL (ref 0–0.44)
EOSINOPHILS RELATIVE PERCENT: 5 % (ref 1–4)
ERYTHROCYTE [DISTWIDTH] IN BLOOD BY AUTOMATED COUNT: 17.4 % (ref 11.8–14.4)
FERRITIN SERPL-MCNC: 11 NG/ML (ref 15–150)
FOLATE SERPL-MCNC: 12.4 NG/ML (ref 4.8–24.2)
HCT VFR BLD AUTO: 40.4 % (ref 36.3–47.1)
HGB BLD-MCNC: 12.1 G/DL (ref 11.9–15.1)
IMM GRANULOCYTES # BLD AUTO: <0.03 K/UL (ref 0–0.3)
IMM GRANULOCYTES NFR BLD: 0 %
IRON SATN MFR SERPL: 22 % (ref 20–55)
IRON SERPL-MCNC: 86 UG/DL (ref 37–145)
LYMPHOCYTES NFR BLD: 1.65 K/UL (ref 1.1–3.7)
LYMPHOCYTES RELATIVE PERCENT: 31 % (ref 24–43)
MCH RBC QN AUTO: 24.3 PG (ref 25.2–33.5)
MCHC RBC AUTO-ENTMCNC: 30 G/DL (ref 28.4–34.8)
MCV RBC AUTO: 81.1 FL (ref 82.6–102.9)
MONOCYTES NFR BLD: 0.35 K/UL (ref 0.1–1.2)
MONOCYTES NFR BLD: 7 % (ref 3–12)
NEUTROPHILS NFR BLD: 56 % (ref 36–65)
NEUTS SEG NFR BLD: 3.07 K/UL (ref 1.5–8.1)
NRBC BLD-RTO: 0 PER 100 WBC
PLATELET # BLD AUTO: 362 K/UL (ref 138–453)
PMV BLD AUTO: 11 FL (ref 8.1–13.5)
RBC # BLD AUTO: 4.98 M/UL (ref 3.95–5.11)
RBC # BLD: ABNORMAL 10*6/UL
TIBC SERPL-MCNC: 385 UG/DL (ref 250–450)
UNSATURATED IRON BINDING CAPACITY: 299 UG/DL (ref 112–347)
VIT B12 SERPL-MCNC: 443 PG/ML (ref 232–1245)
WBC OTHER # BLD: 5.4 K/UL (ref 3.5–11.3)

## 2025-02-04 ENCOUNTER — TRANSCRIBE ORDERS (OUTPATIENT)
Dept: ADMINISTRATIVE | Age: 55
End: 2025-02-04

## 2025-02-04 DIAGNOSIS — M79.604 LEG PAIN, RIGHT: Primary | ICD-10-CM

## 2025-02-04 DIAGNOSIS — M79.89 LEG SWELLING: ICD-10-CM

## 2025-02-06 ENCOUNTER — HOSPITAL ENCOUNTER (OUTPATIENT)
Dept: VASCULAR LAB | Age: 55
Discharge: HOME | End: 2025-02-08
Payer: COMMERCIAL

## 2025-02-06 DIAGNOSIS — M79.89 LEG SWELLING: ICD-10-CM

## 2025-02-06 DIAGNOSIS — M79.604 LEG PAIN, RIGHT: ICD-10-CM

## 2025-02-06 PROCEDURE — 93971 EXTREMITY STUDY: CPT

## 2025-08-13 ENCOUNTER — OFFICE VISIT (OUTPATIENT)
Dept: OBGYN CLINIC | Age: 55
End: 2025-08-13
Payer: COMMERCIAL

## 2025-08-13 VITALS
DIASTOLIC BLOOD PRESSURE: 97 MMHG | SYSTOLIC BLOOD PRESSURE: 148 MMHG | HEIGHT: 64 IN | BODY MASS INDEX: 40.8 KG/M2 | WEIGHT: 239 LBS | HEART RATE: 70 BPM

## 2025-08-13 DIAGNOSIS — Z12.31 SCREENING MAMMOGRAM FOR BREAST CANCER: ICD-10-CM

## 2025-08-13 DIAGNOSIS — Z01.419 WELL WOMAN EXAM WITH ROUTINE GYNECOLOGICAL EXAM: Primary | ICD-10-CM

## 2025-08-13 PROCEDURE — 99396 PREV VISIT EST AGE 40-64: CPT | Performed by: STUDENT IN AN ORGANIZED HEALTH CARE EDUCATION/TRAINING PROGRAM

## 2025-08-13 PROCEDURE — 3077F SYST BP >= 140 MM HG: CPT | Performed by: STUDENT IN AN ORGANIZED HEALTH CARE EDUCATION/TRAINING PROGRAM

## 2025-08-13 PROCEDURE — 3080F DIAST BP >= 90 MM HG: CPT | Performed by: STUDENT IN AN ORGANIZED HEALTH CARE EDUCATION/TRAINING PROGRAM
